# Patient Record
Sex: FEMALE | Race: BLACK OR AFRICAN AMERICAN | NOT HISPANIC OR LATINO | Employment: OTHER | URBAN - METROPOLITAN AREA
[De-identification: names, ages, dates, MRNs, and addresses within clinical notes are randomized per-mention and may not be internally consistent; named-entity substitution may affect disease eponyms.]

---

## 2023-06-14 DIAGNOSIS — I10 PRIMARY HYPERTENSION: Primary | ICD-10-CM

## 2023-06-14 RX ORDER — HYDROCHLOROTHIAZIDE 25 MG/1
TABLET ORAL
Qty: 90 TABLET | Refills: 3 | Status: SHIPPED | OUTPATIENT
Start: 2023-06-14 | End: 2023-06-16

## 2023-06-15 DIAGNOSIS — I10 PRIMARY HYPERTENSION: ICD-10-CM

## 2023-06-16 RX ORDER — HYDROCHLOROTHIAZIDE 25 MG/1
TABLET ORAL
Qty: 90 TABLET | Refills: 3 | Status: SHIPPED | OUTPATIENT
Start: 2023-06-16

## 2023-06-23 DIAGNOSIS — J41.8 MIXED SIMPLE AND MUCOPURULENT CHRONIC BRONCHITIS (MULTI): Primary | ICD-10-CM

## 2023-06-23 DIAGNOSIS — E03.9 ACQUIRED HYPOTHYROIDISM: ICD-10-CM

## 2023-06-26 RX ORDER — LEVOTHYROXINE SODIUM 25 UG/1
TABLET ORAL
Qty: 90 TABLET | Refills: 3 | Status: SHIPPED | OUTPATIENT
Start: 2023-06-26 | End: 2024-05-28

## 2023-06-26 RX ORDER — FLUTICASONE FUROATE, UMECLIDINIUM BROMIDE AND VILANTEROL TRIFENATATE 100; 62.5; 25 UG/1; UG/1; UG/1
POWDER RESPIRATORY (INHALATION)
Qty: 180 EACH | Refills: 3 | Status: SHIPPED | OUTPATIENT
Start: 2023-06-26

## 2023-11-17 ENCOUNTER — TELEPHONE (OUTPATIENT)
Dept: PRIMARY CARE | Facility: CLINIC | Age: 83
End: 2023-11-17

## 2023-11-22 ENCOUNTER — TELEPHONE (OUTPATIENT)
Dept: PRIMARY CARE | Facility: CLINIC | Age: 83
End: 2023-11-22
Payer: MEDICARE

## 2023-11-22 NOTE — TELEPHONE ENCOUNTER
Completed PT eval in patients home yesterday  Plan to see 2x a week for 2 weeks and once a week for one week  Focusing on home safety, balance, endurance   [phone] [facility]

## 2023-11-28 ENCOUNTER — TELEPHONE (OUTPATIENT)
Dept: PRIMARY CARE | Facility: CLINIC | Age: 83
End: 2023-11-28
Payer: MEDICARE

## 2023-11-28 NOTE — TELEPHONE ENCOUNTER
Nurse from [facility] called ,  [phone]  requesting  VO  for add on ,  speech therapy .

## 2023-12-11 PROBLEM — E66.09 OBESITY DUE TO EXCESS CALORIES WITH SERIOUS COMORBIDITY: Status: ACTIVE | Noted: 2023-12-11

## 2023-12-11 PROBLEM — J45.909 ASTHMA (HHS-HCC): Status: ACTIVE | Noted: 2023-12-11

## 2023-12-11 PROBLEM — H60.90 OTITIS EXTERNA: Status: ACTIVE | Noted: 2023-12-11

## 2023-12-11 PROBLEM — N18.32 CHRONIC KIDNEY DISEASE, STAGE 3B (MULTI): Status: ACTIVE | Noted: 2019-02-14

## 2023-12-11 PROBLEM — M79.89 LEFT LEG SWELLING: Status: ACTIVE | Noted: 2023-12-11

## 2023-12-11 PROBLEM — M54.50 LOW BACK PAIN WITH RADIATION: Status: ACTIVE | Noted: 2023-12-11

## 2023-12-11 PROBLEM — I10 HYPERTENSION: Status: ACTIVE | Noted: 2023-12-11

## 2023-12-11 PROBLEM — B33.8 RSV (RESPIRATORY SYNCYTIAL VIRUS INFECTION): Status: ACTIVE | Noted: 2023-11-13

## 2023-12-11 PROBLEM — M25.519 SHOULDER PAIN: Status: ACTIVE | Noted: 2023-12-11

## 2023-12-11 PROBLEM — J30.9 ALLERGIC RHINITIS: Status: ACTIVE | Noted: 2023-12-11

## 2023-12-11 PROBLEM — R41.0 DELIRIUM: Status: ACTIVE | Noted: 2020-02-15

## 2023-12-11 PROBLEM — R29.6 FALLS FREQUENTLY: Status: ACTIVE | Noted: 2023-12-11

## 2023-12-11 PROBLEM — Z77.090 ASBESTOS EXPOSURE: Status: ACTIVE | Noted: 2023-12-11

## 2023-12-11 PROBLEM — S39.011A ABDOMINAL WALL STRAIN: Status: ACTIVE | Noted: 2023-12-11
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
## 2023-12-11 PROBLEM — R53.1 WEAKNESS: Status: ACTIVE | Noted: 2023-12-11

## 2023-12-11 PROBLEM — J96.02 ACUTE HYPERCAPNIC RESPIRATORY FAILURE (MULTI): Status: ACTIVE | Noted: 2023-11-13

## 2023-12-11 PROBLEM — E66.9 OBESITY: Status: ACTIVE | Noted: 2023-12-11

## 2023-12-11 PROBLEM — E03.9 HYPOTHYROIDISM: Status: ACTIVE | Noted: 2023-12-11

## 2023-12-11 PROBLEM — M79.10 MYALGIA: Status: ACTIVE | Noted: 2023-12-11

## 2023-12-11 PROBLEM — R07.9 CHEST PAIN: Status: ACTIVE | Noted: 2020-10-05

## 2023-12-11 PROBLEM — J45.901 ASTHMA EXACERBATION (HHS-HCC): Status: ACTIVE | Noted: 2023-12-11

## 2023-12-11 PROBLEM — N18.31 CHRONIC KIDNEY DISEASE, STAGE 3A (MULTI): Status: ACTIVE | Noted: 2023-12-11

## 2023-12-11 PROBLEM — M19.90 OSTEOARTHRITIS: Status: ACTIVE | Noted: 2023-12-11

## 2023-12-11 PROBLEM — E53.8 B12 DEFICIENCY: Status: ACTIVE | Noted: 2023-12-11

## 2023-12-11 PROBLEM — E55.9 VITAMIN D DEFICIENCY: Status: ACTIVE | Noted: 2023-12-11

## 2023-12-11 PROBLEM — L50.9 URTICARIA: Status: ACTIVE | Noted: 2023-12-11

## 2023-12-11 PROBLEM — D17.9 LIPOMA: Status: ACTIVE | Noted: 2023-12-11

## 2023-12-11 PROBLEM — R73.9 HYPERGLYCEMIA: Status: ACTIVE | Noted: 2023-12-11

## 2023-12-11 PROBLEM — I25.10 ATHEROSCLEROSIS OF CORONARY ARTERY: Status: ACTIVE | Noted: 2017-11-21

## 2023-12-11 PROBLEM — M54.12 CERVICAL RADICULOPATHY: Status: ACTIVE | Noted: 2023-12-11

## 2023-12-11 PROBLEM — I83.93 VARICOSE VEINS OF LEGS: Status: ACTIVE | Noted: 2023-12-11

## 2023-12-11 PROBLEM — R06.09 DYSPNEA ON MINIMAL EXERTION: Status: ACTIVE | Noted: 2023-12-11

## 2023-12-11 PROBLEM — I50.9 CONGESTIVE HEART FAILURE (MULTI): Status: ACTIVE | Noted: 2023-12-11

## 2023-12-11 PROBLEM — R22.9 LUMP OF SKIN: Status: ACTIVE | Noted: 2023-12-11
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
## 2023-12-11 PROBLEM — M25.579 TOE JOINT PAIN: Status: ACTIVE | Noted: 2023-12-11

## 2023-12-11 PROBLEM — J45.20 MILD INTERMITTENT ASTHMA WITHOUT COMPLICATION (HHS-HCC): Status: ACTIVE | Noted: 2018-05-09

## 2023-12-11 PROBLEM — G62.9 NEUROPATHY: Status: ACTIVE | Noted: 2023-12-11

## 2023-12-11 PROBLEM — R09.02 HYPOXIA: Status: ACTIVE | Noted: 2023-12-11

## 2023-12-11 PROBLEM — J44.1 CHRONIC OBSTRUCTIVE PULMONARY DISEASE WITH ACUTE EXACERBATION (MULTI): Status: ACTIVE | Noted: 2018-05-30

## 2023-12-11 PROBLEM — E78.5 HYPERLIPIDEMIA: Status: ACTIVE | Noted: 2023-12-11

## 2023-12-11 PROBLEM — R53.83 FATIGUE: Status: ACTIVE | Noted: 2023-12-11

## 2023-12-11 PROBLEM — M85.80 OSTEOPENIA: Status: ACTIVE | Noted: 2023-12-11

## 2023-12-11 PROBLEM — M25.551 PAIN OF RIGHT HIP JOINT: Status: ACTIVE | Noted: 2023-12-11

## 2023-12-11 PROBLEM — H66.90 OTITIS MEDIA: Status: ACTIVE | Noted: 2023-12-11

## 2023-12-11 PROBLEM — B02.9 SHINGLES: Status: ACTIVE | Noted: 2023-12-11

## 2023-12-11 PROBLEM — I87.2 CHRONIC VENOUS INSUFFICIENCY: Status: ACTIVE | Noted: 2023-12-11

## 2023-12-11 PROBLEM — K21.9 GERD (GASTROESOPHAGEAL REFLUX DISEASE): Status: ACTIVE | Noted: 2023-12-11

## 2023-12-11 RX ORDER — MELOXICAM 7.5 MG/1
1 TABLET ORAL 2 TIMES DAILY PRN
COMMUNITY
Start: 2021-03-16

## 2023-12-11 RX ORDER — ASPIRIN 81 MG/1
1 TABLET ORAL DAILY
COMMUNITY
Start: 2019-08-16

## 2023-12-11 RX ORDER — ALBUTEROL SULFATE 90 UG/1
2 AEROSOL, METERED RESPIRATORY (INHALATION) EVERY 4 HOURS PRN
COMMUNITY
Start: 2017-11-13

## 2023-12-11 RX ORDER — CETIRIZINE HYDROCHLORIDE 10 MG/1
1 TABLET ORAL NIGHTLY
COMMUNITY
Start: 2019-08-16

## 2023-12-11 RX ORDER — ACETAMINOPHEN 500 MG
1 TABLET ORAL DAILY
COMMUNITY
Start: 2022-05-10

## 2023-12-11 RX ORDER — GABAPENTIN 100 MG/1
CAPSULE ORAL
COMMUNITY
Start: 2021-03-16

## 2023-12-12 ENCOUNTER — OFFICE VISIT (OUTPATIENT)
Dept: PRIMARY CARE | Facility: CLINIC | Age: 83
End: 2023-12-12
Payer: MEDICARE

## 2023-12-12 VITALS
OXYGEN SATURATION: 95 % | HEIGHT: 60 IN | BODY MASS INDEX: 43.98 KG/M2 | HEART RATE: 60 BPM | DIASTOLIC BLOOD PRESSURE: 62 MMHG | WEIGHT: 224 LBS | TEMPERATURE: 97.9 F | RESPIRATION RATE: 16 BRPM | SYSTOLIC BLOOD PRESSURE: 102 MMHG

## 2023-12-12 DIAGNOSIS — I50.42 CHRONIC COMBINED SYSTOLIC AND DIASTOLIC CONGESTIVE HEART FAILURE (MULTI): ICD-10-CM

## 2023-12-12 DIAGNOSIS — E78.2 MIXED HYPERLIPIDEMIA: ICD-10-CM

## 2023-12-12 DIAGNOSIS — N18.32 CHRONIC KIDNEY DISEASE, STAGE 3B (MULTI): ICD-10-CM

## 2023-12-12 DIAGNOSIS — R53.1 WEAKNESS: ICD-10-CM

## 2023-12-12 DIAGNOSIS — I95.2 HYPOTENSION DUE TO DRUGS: ICD-10-CM

## 2023-12-12 DIAGNOSIS — B33.8 RSV (RESPIRATORY SYNCYTIAL VIRUS INFECTION): ICD-10-CM

## 2023-12-12 DIAGNOSIS — J44.1 CHRONIC OBSTRUCTIVE PULMONARY DISEASE WITH ACUTE EXACERBATION (MULTI): Primary | ICD-10-CM

## 2023-12-12 DIAGNOSIS — R06.09 DYSPNEA ON MINIMAL EXERTION: ICD-10-CM

## 2023-12-12 PROBLEM — D17.9 LIPOMA: Status: RESOLVED | Noted: 2023-12-11 | Resolved: 2023-12-12

## 2023-12-12 PROBLEM — M79.89 LEFT LEG SWELLING: Status: RESOLVED | Noted: 2023-12-11 | Resolved: 2023-12-12

## 2023-12-12 PROBLEM — R07.9 CHEST PAIN: Status: RESOLVED | Noted: 2020-10-05 | Resolved: 2023-12-12
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
## 2023-12-12 PROBLEM — M54.50 LOW BACK PAIN WITH RADIATION: Status: RESOLVED | Noted: 2023-12-11 | Resolved: 2023-12-12

## 2023-12-12 PROBLEM — M25.551 PAIN OF RIGHT HIP JOINT: Status: RESOLVED | Noted: 2023-12-11 | Resolved: 2023-12-12

## 2023-12-12 PROBLEM — J45.901 ASTHMA EXACERBATION (HHS-HCC): Status: RESOLVED | Noted: 2023-12-11 | Resolved: 2023-12-12

## 2023-12-12 PROBLEM — M25.579 TOE JOINT PAIN: Status: RESOLVED | Noted: 2023-12-11 | Resolved: 2023-12-12

## 2023-12-12 PROBLEM — H66.90 OTITIS MEDIA: Status: RESOLVED | Noted: 2023-12-11 | Resolved: 2023-12-12

## 2023-12-12 PROBLEM — M25.519 SHOULDER PAIN: Status: RESOLVED | Noted: 2023-12-11 | Resolved: 2023-12-12

## 2023-12-12 PROBLEM — R22.9 LUMP OF SKIN: Status: RESOLVED | Noted: 2023-12-11 | Resolved: 2023-12-12

## 2023-12-12 PROBLEM — E66.9 OBESITY: Status: RESOLVED | Noted: 2023-12-11 | Resolved: 2023-12-12

## 2023-12-12 PROBLEM — H60.90 OTITIS EXTERNA: Status: RESOLVED | Noted: 2023-12-11 | Resolved: 2023-12-12

## 2023-12-12 PROBLEM — R53.83 FATIGUE: Status: RESOLVED | Noted: 2023-12-11 | Resolved: 2023-12-12

## 2023-12-12 PROBLEM — S39.011A ABDOMINAL WALL STRAIN: Status: RESOLVED | Noted: 2023-12-11 | Resolved: 2023-12-12

## 2023-12-12 PROBLEM — I83.93 VARICOSE VEINS OF LEGS: Status: RESOLVED | Noted: 2023-12-11 | Resolved: 2023-12-12

## 2023-12-12 PROBLEM — R29.6 FALLS FREQUENTLY: Status: RESOLVED | Noted: 2023-12-11 | Resolved: 2023-12-12
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
## 2023-12-12 PROBLEM — M79.10 MYALGIA: Status: RESOLVED | Noted: 2023-12-11 | Resolved: 2023-12-12

## 2023-12-12 PROBLEM — R09.02 HYPOXIA: Status: RESOLVED | Noted: 2023-12-11 | Resolved: 2023-12-12

## 2023-12-12 PROBLEM — L50.9 URTICARIA: Status: RESOLVED | Noted: 2023-12-11 | Resolved: 2023-12-12

## 2023-12-12 PROCEDURE — 1036F TOBACCO NON-USER: CPT | Performed by: INTERNAL MEDICINE

## 2023-12-12 PROCEDURE — 1159F MED LIST DOCD IN RCRD: CPT | Performed by: INTERNAL MEDICINE

## 2023-12-12 PROCEDURE — 99214 OFFICE O/P EST MOD 30 MIN: CPT | Performed by: INTERNAL MEDICINE

## 2023-12-12 PROCEDURE — 3078F DIAST BP <80 MM HG: CPT | Performed by: INTERNAL MEDICINE

## 2023-12-12 PROCEDURE — 3074F SYST BP LT 130 MM HG: CPT | Performed by: INTERNAL MEDICINE

## 2023-12-12 PROCEDURE — 1160F RVW MEDS BY RX/DR IN RCRD: CPT | Performed by: INTERNAL MEDICINE

## 2023-12-12 RX ORDER — IPRATROPIUM BROMIDE AND ALBUTEROL SULFATE 2.5; .5 MG/3ML; MG/3ML
3 SOLUTION RESPIRATORY (INHALATION) EVERY 8 HOURS PRN
COMMUNITY
Start: 2023-11-15 | End: 2023-12-25

## 2023-12-12 RX ORDER — DEXTROMETHORPHAN HYDROBROMIDE, GUAIFENESIN 5; 100 MG/5ML; MG/5ML
650 LIQUID ORAL EVERY 8 HOURS PRN
Qty: 60 TABLET | Refills: 3 | Status: SHIPPED | OUTPATIENT
Start: 2023-12-12 | End: 2024-03-01

## 2023-12-12 ASSESSMENT — PATIENT HEALTH QUESTIONNAIRE - PHQ9
2. FEELING DOWN, DEPRESSED OR HOPELESS: NOT AT ALL
SUM OF ALL RESPONSES TO PHQ9 QUESTIONS 1 AND 2: 0
1. LITTLE INTEREST OR PLEASURE IN DOING THINGS: NOT AT ALL

## 2023-12-12 NOTE — PROGRESS NOTES
Subjective   Fabiola Vale is a 83 y.o. female who presents for Hospital Follow-up.    HPI  Hospitalizied [facility] 11/13/23 - 11/15/23  Dx: Acute Hypercapnic Respiratory Failure, RSV, COPD exacerbation  Labs, CXR  Med changes: Prednisone, Robitussin DM, Duoneb via neb  [facility] PT/OT  Follow up: PCP  Reports some continued SOB.  BP low after discharge.  Holding hydrochlorothiazide.  BP WNL during [facility] visit.    C/o bilat knee pain w/transfers.  Using walker for transfers for ambulation  Tx: Tylenol, effective.  Requesting Rx.    Review of Systems   Constitutional:  Negative for fatigue and fever.   Respiratory:  Negative for cough and shortness of breath.    Cardiovascular:  Negative for chest pain and leg swelling.   All other systems reviewed and are negative.      Health Maintenance Due   Topic Date Due    Medicare Annual Wellness Visit (AWV)  Never done    Pneumococcal Vaccine: 65+ Years (1 - PCV) Never done    DTaP/Tdap/Td Vaccines (1 - Tdap) Never done    Zoster Vaccines (1 of 2) Never done    Echocardiogram  11/23/2021    COVID-19 Vaccine (4 - Pfizer series) 01/17/2022    Creatinine Level  05/03/2023    Potassium Level  05/03/2023    Influenza Vaccine (1) Never done    TSH Level  01/10/2024    Diabetes Screening  01/10/2024       Objective   /62   Pulse 60   Temp 36.6 °C (97.9 °F)   Resp 16   Ht 1.524 m (5')   Wt 102 kg (224 lb)   SpO2 95%   BMI 43.75 kg/m²     Physical Exam  Vitals and nursing note reviewed.   Constitutional:       Appearance: Normal appearance.   HENT:      Head: Normocephalic.   Eyes:      Conjunctiva/sclera: Conjunctivae normal.      Pupils: Pupils are equal, round, and reactive to light.   Cardiovascular:      Rate and Rhythm: Normal rate and regular rhythm.      Pulses: Normal pulses.      Heart sounds: Normal heart sounds.   Pulmonary:      Effort: Pulmonary effort is normal.      Breath sounds: Normal breath sounds.   Musculoskeletal:         General: No swelling.       Cervical back: Neck supple.   Skin:     General: Skin is warm and dry.   Neurological:      General: No focal deficit present.      Mental Status: She is oriented to person, place, and time.         Assessment/Plan   Problem List Items Addressed This Visit       Chronic obstructive pulmonary disease with acute exacerbation (CMS/HCC) - Primary    Chronic kidney disease, stage 3b (CMS/[facility])    Congestive heart failure (CMS/[facility])    Dyspnea on minimal exertion    Hyperlipidemia    RSV (respiratory syncytial virus infection)    Weakness    Relevant Medications    acetaminophen (Tylenol Arthritis Pain) 650 mg ER tablet     Other Visit Diagnoses       Hypotension due to drugs            Reviewed records  Doing home pt  Holding hydrochlorothiazide  Stable based on symptoms and exam.  Continue established treatment plan and follow up at least yearly.  Cont meds  Wheeled walker

## 2023-12-13 ASSESSMENT — ENCOUNTER SYMPTOMS
FATIGUE: 0
COUGH: 0
SHORTNESS OF BREATH: 0
FEVER: 0

## 2024-04-12 DIAGNOSIS — J44.1 CHRONIC OBSTRUCTIVE PULMONARY DISEASE WITH ACUTE EXACERBATION (MULTI): Primary | ICD-10-CM

## 2024-04-12 DIAGNOSIS — I50.42 CHRONIC COMBINED SYSTOLIC AND DIASTOLIC CONGESTIVE HEART FAILURE (MULTI): ICD-10-CM

## 2024-05-23 DIAGNOSIS — E03.9 ACQUIRED HYPOTHYROIDISM: ICD-10-CM

## 2024-05-28 RX ORDER — LEVOTHYROXINE SODIUM 25 UG/1
25 TABLET ORAL DAILY
Qty: 90 TABLET | Refills: 3 | Status: SHIPPED | OUTPATIENT
Start: 2024-05-28

## 2024-07-24 ENCOUNTER — TELEPHONE (OUTPATIENT)
Dept: PRIMARY CARE | Facility: CLINIC | Age: 84
End: 2024-07-24
Payer: MEDICARE

## 2024-07-24 NOTE — TELEPHONE ENCOUNTER
Teresa, [facility], left  stating they received referral from [facility] for PT/OT.  Questioned if you will follow    [phone] ext. [phone]

## 2024-07-25 ENCOUNTER — PATIENT OUTREACH (OUTPATIENT)
Dept: PRIMARY CARE | Facility: CLINIC | Age: 84
End: 2024-07-25
Payer: MEDICARE

## 2024-07-25 ENCOUNTER — DOCUMENTATION (OUTPATIENT)
Dept: PRIMARY CARE | Facility: CLINIC | Age: 84
End: 2024-07-25
Payer: MEDICARE

## 2024-07-25 RX ORDER — IPRATROPIUM BROMIDE AND ALBUTEROL SULFATE 2.5; .5 MG/3ML; MG/3ML
3 SOLUTION RESPIRATORY (INHALATION) 3 TIMES DAILY PRN
COMMUNITY

## 2024-07-25 RX ORDER — PREDNISONE 20 MG/1
40 TABLET ORAL DAILY
COMMUNITY
Start: 2024-07-25 | End: 2024-07-30

## 2024-07-25 NOTE — PROGRESS NOTES
Discharge Facility: [facility]  Discharge Diagnosis: COPD exacerbation  Admission Date: 7/22/2024  Discharge Date: 7/24/2024    PCP Appointment Date: 7/30/2024  Specialist Appointment Date: none    Hospital Encounter and Summary Linked: Yes  See discharge assessment below for further details    Home Health Care   Agency [facility]   Phone# [phone]     KEY MEDICATION CHANGES:   Prednisone 40 mg daily for 5 days prescribed  DuoNeb refilled     Engagement  Call Start Time: 0938 (7/25/2024  9:38 AM)    Medications  Medications reviewed with patient/caregiver?: Yes (7/25/2024  9:38 AM)  Is the patient having any side effects they believe may be caused by any medication additions or changes?: No (7/25/2024  9:38 AM)  Does the patient have all medications ordered at discharge?: Yes (7/25/2024  9:38 AM)  Care Management Interventions: No intervention needed (7/25/2024  9:38 AM)  Prescription Comments: new: prednisone (7/25/2024  9:38 AM)  Is the patient taking all medications as directed (includes completed medication regime)?: Yes (7/25/2024  9:38 AM)  Medication Comments: see med lsit- no issues with obtaining meds (7/25/2024  9:38 AM)    Appointments  Does the patient have a primary care provider?: Yes (7/25/2024  9:38 AM)  Care Management Interventions: Verified appointment date/time/provider (7/25/2024  9:38 AM)  Has the patient kept scheduled appointments due by today?: Yes (7/25/2024  9:38 AM)  Care Management Interventions: Advised patient to keep appointment (7/25/2024  9:38 AM)    Self Management  What is the home health agency?: [facility] (7/25/2024  9:38 AM)  Has home health visited the patient within 72 hours of discharge?: Call prior to 72 hours (7/25/2024  9:38 AM)    Patient Teaching  Does the patient have access to their discharge instructions?: Yes (7/25/2024  9:38 AM)  Care Management Interventions: Reviewed instructions with patient (7/25/2024  9:38 AM)  What is the  patient's perception of their health status since discharge?: Improving (7/25/2024  9:38 AM)  Is the patient/caregiver able to teach back the hierarchy of who to call/visit for symptoms/problems? PCP, Specialist, Home Health nurse, Urgent Care, ED, 911: Yes (7/25/2024  9:38 AM)    Wrap Up  Wrap Up Additional Comments: CTS spoke with patients daughter Momo. Patient was admitted to [facility] with COPD exacerbation on 7/22/2024. Discharged on 7/24/2024 with [facility]. Daughter stated that the patient was sleeping and resting comfortably. Denies any chest pains. Shortness of breath is better. Patient was discharged with prednisone. No issues obtaining the medications. Daughter does have the nebulizer are using it as directed. Appt is scheduled with PCP for hospital follow up. Understands discharge instructions. Has contact information if any needs arise. No questions or concerns voiced at this time. (7/25/2024  9:38 AM)  Call End Time: 0946 (7/25/2024  9:38 AM)

## 2024-07-30 ENCOUNTER — APPOINTMENT (OUTPATIENT)
Dept: PRIMARY CARE | Facility: CLINIC | Age: 84
End: 2024-07-30
Payer: MEDICARE

## 2024-07-30 VITALS
BODY MASS INDEX: 45.94 KG/M2 | DIASTOLIC BLOOD PRESSURE: 72 MMHG | TEMPERATURE: 97.9 F | SYSTOLIC BLOOD PRESSURE: 138 MMHG | HEIGHT: 60 IN | WEIGHT: 234 LBS | HEART RATE: 60 BPM | RESPIRATION RATE: 16 BRPM | OXYGEN SATURATION: 96 %

## 2024-07-30 DIAGNOSIS — I50.42 CHRONIC COMBINED SYSTOLIC AND DIASTOLIC CONGESTIVE HEART FAILURE (MULTI): ICD-10-CM

## 2024-07-30 DIAGNOSIS — E66.01 CLASS 3 SEVERE OBESITY DUE TO EXCESS CALORIES WITH SERIOUS COMORBIDITY AND BODY MASS INDEX (BMI) OF 45.0 TO 49.9 IN ADULT (MULTI): ICD-10-CM

## 2024-07-30 DIAGNOSIS — Z87.448 HISTORY OF ACUTE RENAL FAILURE: ICD-10-CM

## 2024-07-30 DIAGNOSIS — E78.2 MIXED HYPERLIPIDEMIA: ICD-10-CM

## 2024-07-30 DIAGNOSIS — J44.1 CHRONIC OBSTRUCTIVE PULMONARY DISEASE WITH ACUTE EXACERBATION (MULTI): ICD-10-CM

## 2024-07-30 DIAGNOSIS — M81.0 POSTMENOPAUSAL OSTEOPOROSIS: ICD-10-CM

## 2024-07-30 DIAGNOSIS — J96.02 ACUTE HYPERCAPNIC RESPIRATORY FAILURE (MULTI): ICD-10-CM

## 2024-07-30 DIAGNOSIS — B37.0 THRUSH: Primary | ICD-10-CM

## 2024-07-30 DIAGNOSIS — J41.8 MIXED SIMPLE AND MUCOPURULENT CHRONIC BRONCHITIS (MULTI): ICD-10-CM

## 2024-07-30 DIAGNOSIS — M15.9 PRIMARY OSTEOARTHRITIS INVOLVING MULTIPLE JOINTS: ICD-10-CM

## 2024-07-30 DIAGNOSIS — J45.31 MILD PERSISTENT ASTHMA WITH ACUTE EXACERBATION (HHS-HCC): ICD-10-CM

## 2024-07-30 DIAGNOSIS — E03.9 ACQUIRED HYPOTHYROIDISM: ICD-10-CM

## 2024-07-30 DIAGNOSIS — E53.8 B12 DEFICIENCY: ICD-10-CM

## 2024-07-30 DIAGNOSIS — N18.32 CHRONIC KIDNEY DISEASE, STAGE 3B (MULTI): ICD-10-CM

## 2024-07-30 DIAGNOSIS — Z12.31 ENCOUNTER FOR SCREENING MAMMOGRAM FOR MALIGNANT NEOPLASM OF BREAST: ICD-10-CM

## 2024-07-30 DIAGNOSIS — E55.9 VITAMIN D DEFICIENCY: ICD-10-CM

## 2024-07-30 DIAGNOSIS — Z00.00 HEALTHCARE MAINTENANCE: ICD-10-CM

## 2024-07-30 DIAGNOSIS — I10 PRIMARY HYPERTENSION: ICD-10-CM

## 2024-07-30 PROBLEM — B02.9 SHINGLES: Status: RESOLVED | Noted: 2023-12-11 | Resolved: 2024-07-30

## 2024-07-30 PROBLEM — R53.1 WEAKNESS: Status: RESOLVED | Noted: 2023-12-11 | Resolved: 2024-07-30

## 2024-07-30 PROBLEM — R06.09 DYSPNEA ON MINIMAL EXERTION: Status: RESOLVED | Noted: 2023-12-11 | Resolved: 2024-07-30

## 2024-07-30 PROBLEM — R41.0 DELIRIUM: Status: RESOLVED | Noted: 2020-02-15 | Resolved: 2024-07-30

## 2024-07-30 PROBLEM — B33.8 RSV (RESPIRATORY SYNCYTIAL VIRUS INFECTION): Status: RESOLVED | Noted: 2023-11-13 | Resolved: 2024-07-30

## 2024-07-30 PROCEDURE — 1158F ADVNC CARE PLAN TLK DOCD: CPT | Performed by: INTERNAL MEDICINE

## 2024-07-30 PROCEDURE — 1159F MED LIST DOCD IN RCRD: CPT | Performed by: INTERNAL MEDICINE

## 2024-07-30 PROCEDURE — 1036F TOBACCO NON-USER: CPT | Performed by: INTERNAL MEDICINE

## 2024-07-30 PROCEDURE — G0439 PPPS, SUBSEQ VISIT: HCPCS | Performed by: INTERNAL MEDICINE

## 2024-07-30 PROCEDURE — 1123F ACP DISCUSS/DSCN MKR DOCD: CPT | Performed by: INTERNAL MEDICINE

## 2024-07-30 PROCEDURE — 99496 TRANSJ CARE MGMT HIGH F2F 7D: CPT | Performed by: INTERNAL MEDICINE

## 2024-07-30 PROCEDURE — 1160F RVW MEDS BY RX/DR IN RCRD: CPT | Performed by: INTERNAL MEDICINE

## 2024-07-30 PROCEDURE — 3078F DIAST BP <80 MM HG: CPT | Performed by: INTERNAL MEDICINE

## 2024-07-30 PROCEDURE — 3075F SYST BP GE 130 - 139MM HG: CPT | Performed by: INTERNAL MEDICINE

## 2024-07-30 RX ORDER — FUROSEMIDE 20 MG/1
20 TABLET ORAL DAILY
Qty: 2 TABLET | Refills: 0 | Status: SHIPPED | OUTPATIENT
Start: 2024-07-30 | End: 2024-08-01

## 2024-07-30 RX ORDER — CHLORHEXIDINE GLUCONATE ORAL RINSE 1.2 MG/ML
15 SOLUTION DENTAL AS NEEDED
Qty: 120 ML | Refills: 0 | Status: SHIPPED | OUTPATIENT
Start: 2024-07-30 | End: 2024-07-31 | Stop reason: SDUPTHER

## 2024-07-30 RX ORDER — NYSTATIN 100000 [USP'U]/ML
500000 SUSPENSION ORAL 4 TIMES DAILY
Qty: 280 ML | Refills: 1 | Status: SHIPPED | OUTPATIENT
Start: 2024-07-30 | End: 2024-09-28

## 2024-07-30 RX ORDER — PREDNISONE 10 MG/1
TABLET ORAL
Qty: 30 TABLET | Refills: 0 | Status: SHIPPED | OUTPATIENT
Start: 2024-07-30 | End: 2024-08-09

## 2024-07-30 ASSESSMENT — PATIENT HEALTH QUESTIONNAIRE - PHQ9
1. LITTLE INTEREST OR PLEASURE IN DOING THINGS: NOT AT ALL
SUM OF ALL RESPONSES TO PHQ9 QUESTIONS 1 AND 2: 0
2. FEELING DOWN, DEPRESSED OR HOPELESS: NOT AT ALL

## 2024-07-30 ASSESSMENT — ENCOUNTER SYMPTOMS
DEPRESSION: 0
COUGH: 1
OCCASIONAL FEELINGS OF UNSTEADINESS: 1
LOSS OF SENSATION IN FEET: 0
FEVER: 0
SHORTNESS OF BREATH: 1
FATIGUE: 1

## 2024-07-30 NOTE — PROGRESS NOTES
Subjective   Fabiola Vale is a 84 y.o. female who presents for Hospital Follow-up.    HPI   Hospitalized [facility] 7/22/24 - 7/24/24  Dx: SOB  Labs, CXR, EKG  Med changes: Prednisone  Referred [facility]  Follow up: PCP  C/o continued SOB, occasional chest pressure, fatigued w/exertion.  C/o sore throat.  Using Duoneb daily.    Still feeling short of breath    Flu declines  Covid x3  Shingrix  Pneumo  Tdap  Mamm 22  Pap age  Dxa  Colon cancer screen cologuard 2022  Bmi 45  Depression screen 24  Eye exam 24  Fall risk 24  Adv dir yes    Requesting chlorahexadine.    Review of Systems   Constitutional:  Positive for fatigue. Negative for fever.   Respiratory:  Positive for cough and shortness of breath.    Cardiovascular:  Negative for chest pain and leg swelling.   All other systems reviewed and are negative.      Health Maintenance Due   Topic Date Due    Medicare Annual Wellness Visit (AWV)  Never done    Pneumococcal Vaccine: 65+ Years (1 of 2 - PCV) Never done    CKD: Urine Protein Screening  Never done    DTaP/Tdap/Td Vaccines (1 - Tdap) Never done    Zoster Vaccines (1 of 2) Never done    RSV Pregnant patients and/or  patients aged 60+ years (1 - 1-dose 60+ series) Never done    Echocardiogram  11/23/2021    Creatinine Level  05/03/2023    Potassium Level  05/03/2023    COVID-19 Vaccine (4 - 2023-24 season) 09/01/2023    TSH Level  01/10/2024    Diabetes Screening  01/10/2024       Objective   /72   Pulse 60   Temp 36.6 °C (97.9 °F)   Resp 16   Ht 1.524 m (5')   Wt 106 kg (234 lb)   SpO2 96%   BMI 45.70 kg/m²     Physical Exam  Vitals and nursing note reviewed.   Constitutional:       Appearance: Normal appearance.   HENT:      Head: Normocephalic.   Eyes:      Conjunctiva/sclera: Conjunctivae normal.      Pupils: Pupils are equal, round, and reactive to light.   Cardiovascular:      Rate and Rhythm: Normal rate and regular rhythm.      Pulses: Normal pulses.      Heart sounds: Normal heart sounds.    Pulmonary:      Effort: Pulmonary effort is normal.      Breath sounds: Normal breath sounds.   Musculoskeletal:         General: No swelling.      Cervical back: Neck supple.   Skin:     General: Skin is warm and dry.   Neurological:      General: No focal deficit present.      Mental Status: She is oriented to person, place, and time.         Assessment/Plan   Problem List Items Addressed This Visit       Acute hypercapnic respiratory failure (Multi)    Asthma ([facility])    Chronic obstructive pulmonary disease with acute exacerbation (Multi)    Relevant Medications    predniSONE (Deltasone) 10 mg tablet    B12 deficiency    Relevant Orders    Vitamin B12    Chronic kidney disease, stage 3b (Multi)    Congestive heart failure (Multi)    Relevant Medications    furosemide (Lasix) 20 mg tablet    Hyperlipidemia    Relevant Orders    Thyroid Stimulating Hormone    Lipid Panel    Hypertension    Relevant Orders    CBC    Comprehensive Metabolic Panel    Hypothyroidism    Obesity due to excess calories with serious comorbidity    Osteoarthritis    Vitamin D deficiency    Relevant Orders    Vitamin D 25-Hydroxy,Total (for eval of Vitamin D levels)     Other Visit Diagnoses       Thrush    -  Primary    Relevant Medications    chlorhexidine (Peridex) 0.12 % solution    nystatin (Mycostatin) 100,000 unit/mL suspension    Mixed simple and mucopurulent chronic bronchitis (Multi)        Healthcare maintenance        Encounter for screening mammogram for malignant neoplasm of breast        Relevant Orders    BI mammo bilateral screening tomosynthesis    Postmenopausal osteoporosis        Relevant Orders    XR DEXA bone density    History of acute renal failure            Updagte preventive  Cont meds  Add steroid and lasix x 2 days  Activity  Check labs  Stable based on symptoms and exam.  Continue established treatment plan and follow up at least yearly.  Reviewed all records  Call with update in 2-3 days         Patient  was identified as a fall risk. Risk prevention instructions provided.

## 2024-07-30 NOTE — PATIENT INSTRUCTIONS

## 2024-07-31 DIAGNOSIS — B37.0 THRUSH: ICD-10-CM

## 2024-07-31 RX ORDER — CHLORHEXIDINE GLUCONATE ORAL RINSE 1.2 MG/ML
15 SOLUTION DENTAL DAILY
Qty: 120 ML | Refills: 0 | Status: SHIPPED | OUTPATIENT
Start: 2024-07-31

## 2024-08-02 ENCOUNTER — PATIENT OUTREACH (OUTPATIENT)
Dept: PRIMARY CARE | Facility: CLINIC | Age: 84
End: 2024-08-02
Payer: MEDICARE

## 2024-08-02 NOTE — PROGRESS NOTES
Call regarding appt. with PCP on 7/30/2024 after hospitalization.  At time of outreach call the patient feels as if their condition has improved since last visit.  Reviewed the PCP appointment with the pt and addressed any questions or concerns.

## 2024-08-20 ENCOUNTER — APPOINTMENT (OUTPATIENT)
Dept: RADIOLOGY | Facility: HOSPITAL | Age: 84
DRG: 884 | End: 2024-08-20
Payer: MEDICARE

## 2024-08-20 ENCOUNTER — APPOINTMENT (OUTPATIENT)
Dept: CARDIOLOGY | Facility: HOSPITAL | Age: 84
DRG: 884 | End: 2024-08-20
Payer: MEDICARE

## 2024-08-20 ENCOUNTER — HOSPITAL ENCOUNTER (EMERGENCY)
Facility: HOSPITAL | Age: 84
Discharge: HOME | DRG: 884 | End: 2024-08-20
Payer: MEDICARE

## 2024-08-20 VITALS
HEART RATE: 63 BPM | RESPIRATION RATE: 16 BRPM | BODY MASS INDEX: 45.16 KG/M2 | SYSTOLIC BLOOD PRESSURE: 141 MMHG | DIASTOLIC BLOOD PRESSURE: 65 MMHG | HEIGHT: 60 IN | OXYGEN SATURATION: 94 % | TEMPERATURE: 97.5 F | WEIGHT: 230 LBS

## 2024-08-20 DIAGNOSIS — K59.00 CONSTIPATION, UNSPECIFIED CONSTIPATION TYPE: Primary | ICD-10-CM

## 2024-08-20 DIAGNOSIS — N30.91 CYSTITIS WITH HEMATURIA: ICD-10-CM

## 2024-08-20 LAB
ALBUMIN SERPL BCP-MCNC: 3.8 G/DL (ref 3.4–5)
ALP SERPL-CCNC: 77 U/L (ref 33–136)
ALT SERPL W P-5'-P-CCNC: 9 U/L (ref 7–45)
ANION GAP SERPL CALC-SCNC: 13 MMOL/L (ref 10–20)
APPEARANCE UR: ABNORMAL
AST SERPL W P-5'-P-CCNC: 14 U/L (ref 9–39)
BACTERIA #/AREA URNS AUTO: ABNORMAL /HPF
BASOPHILS # BLD AUTO: 0.01 X10*3/UL (ref 0–0.1)
BASOPHILS NFR BLD AUTO: 0.2 %
BILIRUB SERPL-MCNC: 0.8 MG/DL (ref 0–1.2)
BILIRUB UR STRIP.AUTO-MCNC: NEGATIVE MG/DL
BUN SERPL-MCNC: 14 MG/DL (ref 6–23)
CALCIUM SERPL-MCNC: 9 MG/DL (ref 8.6–10.3)
CARDIAC TROPONIN I PNL SERPL HS: 12 NG/L (ref 0–13)
CHLORIDE SERPL-SCNC: 108 MMOL/L (ref 98–107)
CO2 SERPL-SCNC: 24 MMOL/L (ref 21–32)
COLOR UR: YELLOW
CREAT SERPL-MCNC: 1.16 MG/DL (ref 0.5–1.05)
EGFRCR SERPLBLD CKD-EPI 2021: 47 ML/MIN/1.73M*2
EOSINOPHIL # BLD AUTO: 0.1 X10*3/UL (ref 0–0.4)
EOSINOPHIL NFR BLD AUTO: 2.4 %
ERYTHROCYTE [DISTWIDTH] IN BLOOD BY AUTOMATED COUNT: 13.5 % (ref 11.5–14.5)
GLUCOSE SERPL-MCNC: 98 MG/DL (ref 74–99)
GLUCOSE UR STRIP.AUTO-MCNC: NORMAL MG/DL
HCT VFR BLD AUTO: 41 % (ref 36–46)
HGB BLD-MCNC: 13.5 G/DL (ref 12–16)
HOLD SPECIMEN: NORMAL
IMM GRANULOCYTES # BLD AUTO: 0.01 X10*3/UL (ref 0–0.5)
IMM GRANULOCYTES NFR BLD AUTO: 0.2 % (ref 0–0.9)
KETONES UR STRIP.AUTO-MCNC: ABNORMAL MG/DL
LEUKOCYTE ESTERASE UR QL STRIP.AUTO: ABNORMAL
LIPASE SERPL-CCNC: 6 U/L (ref 9–82)
LYMPHOCYTES # BLD AUTO: 1.05 X10*3/UL (ref 0.8–3)
LYMPHOCYTES NFR BLD AUTO: 25.4 %
MCH RBC QN AUTO: 30.7 PG (ref 26–34)
MCHC RBC AUTO-ENTMCNC: 32.9 G/DL (ref 32–36)
MCV RBC AUTO: 93 FL (ref 80–100)
MONOCYTES # BLD AUTO: 0.36 X10*3/UL (ref 0.05–0.8)
MONOCYTES NFR BLD AUTO: 8.7 %
MUCOUS THREADS #/AREA URNS AUTO: ABNORMAL /LPF
NEUTROPHILS # BLD AUTO: 2.61 X10*3/UL (ref 1.6–5.5)
NEUTROPHILS NFR BLD AUTO: 63.1 %
NITRITE UR QL STRIP.AUTO: NEGATIVE
NRBC BLD-RTO: 0 /100 WBCS (ref 0–0)
PH UR STRIP.AUTO: 5.5 [PH]
PLATELET # BLD AUTO: 213 X10*3/UL (ref 150–450)
POTASSIUM SERPL-SCNC: 3.8 MMOL/L (ref 3.5–5.3)
PROT SERPL-MCNC: 6.8 G/DL (ref 6.4–8.2)
PROT UR STRIP.AUTO-MCNC: ABNORMAL MG/DL
RBC # BLD AUTO: 4.4 X10*6/UL (ref 4–5.2)
RBC # UR STRIP.AUTO: ABNORMAL /UL
RBC #/AREA URNS AUTO: ABNORMAL /HPF
SODIUM SERPL-SCNC: 141 MMOL/L (ref 136–145)
SP GR UR STRIP.AUTO: >1.05
SQUAMOUS #/AREA URNS AUTO: ABNORMAL /HPF
UROBILINOGEN UR STRIP.AUTO-MCNC: ABNORMAL MG/DL
WBC # BLD AUTO: 4.1 X10*3/UL (ref 4.4–11.3)
WBC #/AREA URNS AUTO: ABNORMAL /HPF

## 2024-08-20 PROCEDURE — 96376 TX/PRO/DX INJ SAME DRUG ADON: CPT

## 2024-08-20 PROCEDURE — 71045 X-RAY EXAM CHEST 1 VIEW: CPT | Performed by: RADIOLOGY

## 2024-08-20 PROCEDURE — 80053 COMPREHEN METABOLIC PANEL: CPT | Performed by: REGISTERED NURSE

## 2024-08-20 PROCEDURE — 70450 CT HEAD/BRAIN W/O DYE: CPT | Performed by: RADIOLOGY

## 2024-08-20 PROCEDURE — 81001 URINALYSIS AUTO W/SCOPE: CPT | Performed by: REGISTERED NURSE

## 2024-08-20 PROCEDURE — 74177 CT ABD & PELVIS W/CONTRAST: CPT | Performed by: RADIOLOGY

## 2024-08-20 PROCEDURE — 2550000001 HC RX 255 CONTRASTS: Performed by: REGISTERED NURSE

## 2024-08-20 PROCEDURE — 96375 TX/PRO/DX INJ NEW DRUG ADDON: CPT

## 2024-08-20 PROCEDURE — 85025 COMPLETE CBC W/AUTO DIFF WBC: CPT | Performed by: REGISTERED NURSE

## 2024-08-20 PROCEDURE — 83690 ASSAY OF LIPASE: CPT | Performed by: REGISTERED NURSE

## 2024-08-20 PROCEDURE — 96374 THER/PROPH/DIAG INJ IV PUSH: CPT | Mod: 59

## 2024-08-20 PROCEDURE — 70450 CT HEAD/BRAIN W/O DYE: CPT

## 2024-08-20 PROCEDURE — 93005 ELECTROCARDIOGRAM TRACING: CPT

## 2024-08-20 PROCEDURE — 84484 ASSAY OF TROPONIN QUANT: CPT | Performed by: REGISTERED NURSE

## 2024-08-20 PROCEDURE — 36415 COLL VENOUS BLD VENIPUNCTURE: CPT | Performed by: REGISTERED NURSE

## 2024-08-20 PROCEDURE — 71045 X-RAY EXAM CHEST 1 VIEW: CPT

## 2024-08-20 PROCEDURE — 2500000002 HC RX 250 W HCPCS SELF ADMINISTERED DRUGS (ALT 637 FOR MEDICARE OP, ALT 636 FOR OP/ED): Performed by: REGISTERED NURSE

## 2024-08-20 PROCEDURE — 87086 URINE CULTURE/COLONY COUNT: CPT | Mod: ELYLAB | Performed by: REGISTERED NURSE

## 2024-08-20 PROCEDURE — 74177 CT ABD & PELVIS W/CONTRAST: CPT

## 2024-08-20 PROCEDURE — 2500000005 HC RX 250 GENERAL PHARMACY W/O HCPCS: Performed by: REGISTERED NURSE

## 2024-08-20 PROCEDURE — 99285 EMERGENCY DEPT VISIT HI MDM: CPT | Mod: 25

## 2024-08-20 PROCEDURE — 2500000004 HC RX 250 GENERAL PHARMACY W/ HCPCS (ALT 636 FOR OP/ED): Performed by: REGISTERED NURSE

## 2024-08-20 PROCEDURE — 84443 ASSAY THYROID STIM HORMONE: CPT | Performed by: INTERNAL MEDICINE

## 2024-08-20 RX ORDER — ONDANSETRON HYDROCHLORIDE 2 MG/ML
4 INJECTION, SOLUTION INTRAVENOUS ONCE
Status: COMPLETED | OUTPATIENT
Start: 2024-08-20 | End: 2024-08-20

## 2024-08-20 RX ORDER — SENNOSIDES 8.6 MG/1
1 TABLET ORAL DAILY
Qty: 2 TABLET | Refills: 0 | Status: ON HOLD | OUTPATIENT
Start: 2024-08-20 | End: 2024-08-24

## 2024-08-20 RX ORDER — MORPHINE SULFATE 2 MG/ML
2 INJECTION, SOLUTION INTRAMUSCULAR; INTRAVENOUS ONCE
Status: COMPLETED | OUTPATIENT
Start: 2024-08-20 | End: 2024-08-20

## 2024-08-20 RX ORDER — SULFAMETHOXAZOLE AND TRIMETHOPRIM 800; 160 MG/1; MG/1
1 TABLET ORAL ONCE
Status: COMPLETED | OUTPATIENT
Start: 2024-08-20 | End: 2024-08-20

## 2024-08-20 RX ORDER — SULFAMETHOXAZOLE AND TRIMETHOPRIM 800; 160 MG/1; MG/1
1 TABLET ORAL 2 TIMES DAILY
Qty: 28 TABLET | Refills: 0 | Status: SHIPPED | OUTPATIENT
Start: 2024-08-20 | End: 2024-08-24 | Stop reason: HOSPADM

## 2024-08-20 RX ORDER — FAMOTIDINE 10 MG/ML
40 INJECTION INTRAVENOUS ONCE
Status: COMPLETED | OUTPATIENT
Start: 2024-08-20 | End: 2024-08-20

## 2024-08-20 ASSESSMENT — PAIN DESCRIPTION - PAIN TYPE
TYPE: ACUTE PAIN
TYPE: ACUTE PAIN

## 2024-08-20 ASSESSMENT — PAIN DESCRIPTION - PROGRESSION
CLINICAL_PROGRESSION: GRADUALLY IMPROVING
CLINICAL_PROGRESSION: GRADUALLY IMPROVING

## 2024-08-20 ASSESSMENT — PAIN - FUNCTIONAL ASSESSMENT
PAIN_FUNCTIONAL_ASSESSMENT: 0-10

## 2024-08-20 ASSESSMENT — PAIN SCALES - GENERAL
PAINLEVEL_OUTOF10: 10 - WORST POSSIBLE PAIN
PAINLEVEL_OUTOF10: 6
PAINLEVEL_OUTOF10: 6
PAINLEVEL_OUTOF10: 9

## 2024-08-20 ASSESSMENT — LIFESTYLE VARIABLES
HAVE YOU EVER FELT YOU SHOULD CUT DOWN ON YOUR DRINKING: NO
TOTAL SCORE: 0
EVER HAD A DRINK FIRST THING IN THE MORNING TO STEADY YOUR NERVES TO GET RID OF A HANGOVER: NO
EVER FELT BAD OR GUILTY ABOUT YOUR DRINKING: NO
HAVE PEOPLE ANNOYED YOU BY CRITICIZING YOUR DRINKING: NO

## 2024-08-20 ASSESSMENT — PAIN DESCRIPTION - LOCATION: LOCATION: BACK

## 2024-08-20 NOTE — ED PROVIDER NOTES
HPI   Chief Complaint   Patient presents with    Rectal Pain     Was constipated and had a bowel movement yesterday after daughter gave Denise lax on Sunday      84-year-old female with past medical history significant for COPD, kidney disease, hypertension, GERD, hypothyroidism presents emergency department today for evaluation of what her daughter believes to be constipation.  Patient's daughter is at bedside tells me she has been patient's caregiver for the last 2 years.  She tells me the patient has no official diagnosis of dementia but she thinks that she might have this.  She tells me over the last few days the patient has been experiencing pain in low lower back and has not had a good bowel movement.  She tells me that patient is alert and oriented x 3 and independent with her own care.  She tells me she has continued to be independent with her own care but she does not think that she has had a bowel movement.  Daughter did want her to be tested for dementia while in the ED today.  However I did explain to her we are unable to do this and that she could follow with her primary care physician.  Patient herself has no complaints.  Patient is answering my questions by nodding however she is not speaking at the moment.  Patient tells me she is having pain in her rectum.      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  Social History     Tobacco Use    Smoking status: Former     Types: Cigarettes    Smokeless tobacco: Never   Substance Use Topics     Alcohol use: Not on file    Drug use: Not on file       Physical Exam   ED Triage Vitals [08/20/24 0830]   Temperature Heart Rate Respirations BP   36.2 °C (97.2 °F) 81 20 159/64      Pulse Ox Temp Source Heart Rate Source Patient Position   94 % Temporal Monitor Sitting      BP Location FiO2 (%)     Right arm --       Physical Exam  Vitals and nursing note reviewed.   Constitutional:       Appearance: Normal appearance. She is normal weight.   HENT:      Head: Normocephalic and atraumatic.      Right Ear: Tympanic membrane normal.      Left Ear: Tympanic membrane normal.      Nose: Nose normal.      Mouth/Throat:      Mouth: Mucous membranes are moist.   Eyes:      Pupils: Pupils are equal, round, and reactive to light.   Cardiovascular:      Rate and Rhythm: Normal rate and regular rhythm.   Pulmonary:      Effort: Pulmonary effort is normal.      Breath sounds: Normal breath sounds.   Abdominal:      General: Bowel sounds are normal.      Palpations: Abdomen is soft.      Tenderness: There is generalized abdominal tenderness.   Musculoskeletal:         General: Normal range of motion.      Cervical back: Normal range of motion and neck supple.   Skin:     General: Skin is warm.      Capillary Refill: Capillary refill takes less than 2 seconds.   Neurological:      General: No focal deficit present.      Mental Status: She is alert and oriented to person, place, and time.   Psychiatric:         Mood and Affect: Mood normal.         Behavior: Behavior normal.         Thought Content: Thought content normal.         Judgment: Judgment normal.           ED Course & MDM   Diagnoses as of 08/20/24 1403   Constipation, unspecified constipation type   Cystitis with hematuria                 No data recorded     Mary Coma Scale Score: 15 (08/20/24 0854 : Ravi Canchola RN)                           Medical Decision Making    Patient seen examined emergency department; patient is nontoxic in appearance not appearing  acute distress.  Patient's abdomen is diffusely tender without any localization of pain.  Patient's lung sounds are clear bilaterally without any adventitious noise.  Heart regular rate and rhythm without any murmur appreciated.  Initially patient was not communicative however I did go back in again and she was able to better articulate her pain.  She tells me that it is in her rectum and she has not had a bowel movement for several days.  Given patient's reported constipation will order CT of the abdomen pelvis to evaluate for obstruction, perforation or obstruction.  I also will order head CT, urinalysis CBC and CMP to evaluate for source of daughter's reported confusion.  Patient's symptoms treated with IV fluids, IV Pepcid, IV Zofran and IV morphine    EKG at 09:29 with ventricular rate 59, as interpreted by me, shows a sinus bradycardia, normal axis, normal intervals. And normal ST and T wave pattern with no evidence of acute ischemia or other acute findings    CBC is unremarkable on my review, CMP with a slightly elevated creatinine at 1.16.  Urinalysis does have leukocytes and blood however is negative for nitrites but there is a large amount of white blood cells noted.  High sensitive troponin is negative.  Chest x-ray does show some prominence of the cardiac silhouette.    CT of the abdomen pelvis does show possible fecal impaction.  I did go to disimpact patient however she did not tolerate this well only a small amount of soft stool was able to be removed.  Patient attempting to sit on bedpan and have her own bowel movement.  Patient was unable to successfully use the bedpan therefore we did attempt a fleets enema however patient did not do well with this.  I did call and talk to daughter about discharge planning she is no longer at bedside.  She tells me that she did give patient MiraLAX yesterday however I did tell her that multiple doses are typically required to relieve constipation.  I did discuss  with her discharge.  Patient will be discharged home with a prescription for Bactrim given her UA results.  Will also discharge home with 2 senna pills.  I did direct daughter to give her 1 senna pill tonight and if she does not have a BM by tomorrow afternoon she should administer the second pill.  I did also recommend that she follow-up with patient's primary care provider as previously discussed for dementia diagnosis.  All questions and concerns were addressed prior to discharge.  Patient discharged home in stable condition.    Labs Reviewed   CBC WITH AUTO DIFFERENTIAL - Abnormal       Result Value    WBC 4.1 (*)     nRBC 0.0      RBC 4.40      Hemoglobin 13.5      Hematocrit 41.0      MCV 93      MCH 30.7      MCHC 32.9      RDW 13.5      Platelets 213      Neutrophils % 63.1      Immature Granulocytes %, Automated 0.2      Lymphocytes % 25.4      Monocytes % 8.7      Eosinophils % 2.4      Basophils % 0.2      Neutrophils Absolute 2.61      Immature Granulocytes Absolute, Automated 0.01      Lymphocytes Absolute 1.05      Monocytes Absolute 0.36      Eosinophils Absolute 0.10      Basophils Absolute 0.01     COMPREHENSIVE METABOLIC PANEL - Abnormal    Glucose 98      Sodium 141      Potassium 3.8      Chloride 108 (*)     Bicarbonate 24      Anion Gap 13      Urea Nitrogen 14      Creatinine 1.16 (*)     eGFR 47 (*)     Calcium 9.0      Albumin 3.8      Alkaline Phosphatase 77      Total Protein 6.8      AST 14      Bilirubin, Total 0.8      ALT 9     LIPASE - Abnormal    Lipase 6 (*)     Narrative:     Venipuncture immediately after or during the administration of Metamizole may lead to falsely low results. Testing should be performed immediately prior to Metamizole dosing.   URINALYSIS WITH REFLEX CULTURE AND MICROSCOPIC - Abnormal    Color, Urine Yellow      Appearance, Urine Turbid (*)     Specific Gravity, Urine >1.050 (*)     pH, Urine 5.5      Protein, Urine 30 (1+) (*)     Glucose, Urine Normal       Blood, Urine 0.2 (2+) (*)     Ketones, Urine 10 (1+) (*)     Bilirubin, Urine NEGATIVE      Urobilinogen, Urine 2 (1+) (*)     Nitrite, Urine NEGATIVE      Leukocyte Esterase, Urine 75 Alyssa/µL (*)    MICROSCOPIC ONLY, URINE - Abnormal    WBC, Urine 6-10 (*)     RBC, Urine 11-20 (*)     Squamous Epithelial Cells, Urine 10-25 (FEW)      Bacteria, Urine 1+ (*)     Mucus, Urine 3+     TROPONIN I, HIGH SENSITIVITY - Normal    Troponin I, High Sensitivity 12      Narrative:     Less than 99th percentile of normal range cutoff-  Female and children under 18 years old <14 ng/L; Male <21 ng/L: Negative  Repeat testing should be performed if clinically indicated.     Female and children under 18 years old 14-50 ng/L; Male 21-50 ng/L:  Consistent with possible cardiac damage and possible increased clinical   risk. Serial measurements may help to assess extent of myocardial damage.     >50 ng/L: Consistent with cardiac damage, increased clinical risk and  myocardial infarction. Serial measurements may help assess extent of   myocardial damage.      NOTE: Children less than 1 year old may have higher baseline troponin   levels and results should be interpreted in conjunction with the overall   clinical context.     NOTE: Troponin I testing is performed using a different   testing methodology at [facility] than at other   [facility]. Direct result comparisons should only   be made within the same method.   URINE CULTURE   URINALYSIS WITH REFLEX CULTURE AND MICROSCOPIC    Narrative:     The following orders were created for panel order Urinalysis with Reflex Culture and Microscopic.  Procedure                               Abnormality         Status                     ---------                               -----------         ------                     Urinalysis with Reflex C...[[id number]]  Abnormal            Final result               Extra Urine Gray Tube[[id number]]                            In process                    Please view results for these tests on the individual orders.   EXTRA URINE GRAY TUBE       XR chest 1 view   Final Result   Prominent cardiac silhouette. Somewhat limited resolution.        MACRO:   none        Signed by: Tracy Banks 8/20/2024 10:55 AM   Dictation workstation:   [id number]      CT head wo IV contrast   Final Result   Mild age related changes without acute intracranial process.        Signed by: Sebastian Kimble 8/20/2024 10:55 AM   Dictation workstation:   [id number]      CT abdomen pelvis w IV contrast   Final Result   No acute abnormality of the abdomen or pelvis. Nonobstructing right   renal calculus.        Question of fecal impaction.        MACRO:   none        Signed by: Tracy Banks 8/20/2024 10:53 AM   Dictation workstation:   [id number]          Procedure  Procedures     Madhuri Lynn, KIMBERLY-CNP  08/20/24 0286

## 2024-08-21 ENCOUNTER — APPOINTMENT (OUTPATIENT)
Dept: RADIOLOGY | Facility: HOSPITAL | Age: 84
DRG: 884 | End: 2024-08-21
Payer: MEDICARE

## 2024-08-21 ENCOUNTER — TELEPHONE (OUTPATIENT)
Dept: PRIMARY CARE | Facility: CLINIC | Age: 84
End: 2024-08-21

## 2024-08-21 ENCOUNTER — HOSPITAL ENCOUNTER (INPATIENT)
Facility: HOSPITAL | Age: 84
LOS: 3 days | Discharge: NURSING, REHAB OR LONG-TERM CARE | DRG: 884 | End: 2024-08-24
Attending: INTERNAL MEDICINE | Admitting: INTERNAL MEDICINE
Payer: MEDICARE

## 2024-08-21 DIAGNOSIS — N30.00 ACUTE CYSTITIS WITHOUT HEMATURIA: ICD-10-CM

## 2024-08-21 DIAGNOSIS — G93.40 ACUTE ENCEPHALOPATHY: Primary | ICD-10-CM

## 2024-08-21 DIAGNOSIS — I10 PRIMARY HYPERTENSION: ICD-10-CM

## 2024-08-21 DIAGNOSIS — K59.00 CONSTIPATION, UNSPECIFIED CONSTIPATION TYPE: ICD-10-CM

## 2024-08-21 DIAGNOSIS — R41.82 ALTERED MENTAL STATUS, UNSPECIFIED ALTERED MENTAL STATUS TYPE: ICD-10-CM

## 2024-08-21 LAB
ALBUMIN SERPL BCP-MCNC: 3.5 G/DL (ref 3.4–5)
ALP SERPL-CCNC: 80 U/L (ref 33–136)
ALT SERPL W P-5'-P-CCNC: 7 U/L (ref 7–45)
ANION GAP SERPL CALC-SCNC: 17 MMOL/L (ref 10–20)
AST SERPL W P-5'-P-CCNC: 19 U/L (ref 9–39)
BACTERIA UR CULT: NORMAL
BASOPHILS # BLD AUTO: 0.02 X10*3/UL (ref 0–0.1)
BASOPHILS NFR BLD AUTO: 0.4 %
BILIRUB SERPL-MCNC: 0.8 MG/DL (ref 0–1.2)
BUN SERPL-MCNC: 15 MG/DL (ref 6–23)
CALCIUM SERPL-MCNC: 8.5 MG/DL (ref 8.6–10.3)
CHLORIDE SERPL-SCNC: 108 MMOL/L (ref 98–107)
CO2 SERPL-SCNC: 18 MMOL/L (ref 21–32)
CREAT SERPL-MCNC: 1.22 MG/DL (ref 0.5–1.05)
EGFRCR SERPLBLD CKD-EPI 2021: 44 ML/MIN/1.73M*2
EOSINOPHIL # BLD AUTO: 0.21 X10*3/UL (ref 0–0.4)
EOSINOPHIL NFR BLD AUTO: 4.3 %
ERYTHROCYTE [DISTWIDTH] IN BLOOD BY AUTOMATED COUNT: 13.6 % (ref 11.5–14.5)
GLUCOSE BLD MANUAL STRIP-MCNC: 94 MG/DL (ref 74–99)
GLUCOSE SERPL-MCNC: 72 MG/DL (ref 74–99)
HCT VFR BLD AUTO: 42.3 % (ref 36–46)
HGB BLD-MCNC: 13.5 G/DL (ref 12–16)
IMM GRANULOCYTES # BLD AUTO: 0.01 X10*3/UL (ref 0–0.5)
IMM GRANULOCYTES NFR BLD AUTO: 0.2 % (ref 0–0.9)
LACTATE SERPL-SCNC: 1.8 MMOL/L (ref 0.4–2)
LYMPHOCYTES # BLD AUTO: 1.08 X10*3/UL (ref 0.8–3)
LYMPHOCYTES NFR BLD AUTO: 21.9 %
MCH RBC QN AUTO: 31 PG (ref 26–34)
MCHC RBC AUTO-ENTMCNC: 31.9 G/DL (ref 32–36)
MCV RBC AUTO: 97 FL (ref 80–100)
MONOCYTES # BLD AUTO: 0.56 X10*3/UL (ref 0.05–0.8)
MONOCYTES NFR BLD AUTO: 11.3 %
NEUTROPHILS # BLD AUTO: 3.06 X10*3/UL (ref 1.6–5.5)
NEUTROPHILS NFR BLD AUTO: 61.9 %
NRBC BLD-RTO: 0 /100 WBCS (ref 0–0)
PLATELET # BLD AUTO: 181 X10*3/UL (ref 150–450)
POTASSIUM SERPL-SCNC: 3.8 MMOL/L (ref 3.5–5.3)
PROT SERPL-MCNC: 6.6 G/DL (ref 6.4–8.2)
RBC # BLD AUTO: 4.36 X10*6/UL (ref 4–5.2)
SODIUM SERPL-SCNC: 139 MMOL/L (ref 136–145)
TSH SERPL-ACNC: 2.91 MIU/L (ref 0.44–3.98)
WBC # BLD AUTO: 4.9 X10*3/UL (ref 4.4–11.3)

## 2024-08-21 PROCEDURE — 74018 RADEX ABDOMEN 1 VIEW: CPT

## 2024-08-21 PROCEDURE — 85025 COMPLETE CBC W/AUTO DIFF WBC: CPT | Performed by: NURSE PRACTITIONER

## 2024-08-21 PROCEDURE — 2500000005 HC RX 250 GENERAL PHARMACY W/O HCPCS: Performed by: NURSE PRACTITIONER

## 2024-08-21 PROCEDURE — 99223 1ST HOSP IP/OBS HIGH 75: CPT | Performed by: INTERNAL MEDICINE

## 2024-08-21 PROCEDURE — 80053 COMPREHEN METABOLIC PANEL: CPT | Performed by: NURSE PRACTITIONER

## 2024-08-21 PROCEDURE — 99285 EMERGENCY DEPT VISIT HI MDM: CPT | Mod: 25

## 2024-08-21 PROCEDURE — 82947 ASSAY GLUCOSE BLOOD QUANT: CPT

## 2024-08-21 PROCEDURE — 83605 ASSAY OF LACTIC ACID: CPT | Performed by: NURSE PRACTITIONER

## 2024-08-21 PROCEDURE — 36415 COLL VENOUS BLD VENIPUNCTURE: CPT | Performed by: NURSE PRACTITIONER

## 2024-08-21 PROCEDURE — 1210000001 HC SEMI-PRIVATE ROOM DAILY

## 2024-08-21 PROCEDURE — 2500000004 HC RX 250 GENERAL PHARMACY W/ HCPCS (ALT 636 FOR OP/ED): Performed by: NURSE PRACTITIONER

## 2024-08-21 PROCEDURE — 96365 THER/PROPH/DIAG IV INF INIT: CPT

## 2024-08-21 PROCEDURE — 74018 RADEX ABDOMEN 1 VIEW: CPT | Performed by: RADIOLOGY

## 2024-08-21 RX ORDER — CETIRIZINE HYDROCHLORIDE 10 MG/1
10 TABLET ORAL DAILY
Status: DISCONTINUED | OUTPATIENT
Start: 2024-08-22 | End: 2024-08-24 | Stop reason: HOSPADM

## 2024-08-21 RX ORDER — DEXTROSE 50 % IN WATER (D50W) INTRAVENOUS SYRINGE
25 ONCE
Status: COMPLETED | OUTPATIENT
Start: 2024-08-21 | End: 2024-08-21

## 2024-08-21 RX ORDER — MINERAL OIL
180 ENEMA (ML) RECTAL DAILY
COMMUNITY

## 2024-08-21 RX ORDER — ASPIRIN 81 MG/1
81 TABLET ORAL DAILY
Status: DISCONTINUED | OUTPATIENT
Start: 2024-08-22 | End: 2024-08-24 | Stop reason: HOSPADM

## 2024-08-21 RX ORDER — LEVOTHYROXINE SODIUM 25 UG/1
25 TABLET ORAL DAILY
Status: DISCONTINUED | OUTPATIENT
Start: 2024-08-22 | End: 2024-08-24 | Stop reason: HOSPADM

## 2024-08-21 RX ORDER — SENNOSIDES 8.6 MG/1
1 TABLET ORAL DAILY
Status: DISCONTINUED | OUTPATIENT
Start: 2024-08-21 | End: 2024-08-24 | Stop reason: HOSPADM

## 2024-08-21 RX ORDER — ONDANSETRON HYDROCHLORIDE 2 MG/ML
4 INJECTION, SOLUTION INTRAVENOUS EVERY 8 HOURS PRN
Status: DISCONTINUED | OUTPATIENT
Start: 2024-08-21 | End: 2024-08-24 | Stop reason: HOSPADM

## 2024-08-21 RX ORDER — HALOPERIDOL 5 MG/ML
2 INJECTION INTRAMUSCULAR ONCE
Status: DISCONTINUED | OUTPATIENT
Start: 2024-08-21 | End: 2024-08-22

## 2024-08-21 RX ORDER — ACETAMINOPHEN 325 MG/1
650 TABLET ORAL EVERY 4 HOURS PRN
Status: DISCONTINUED | OUTPATIENT
Start: 2024-08-21 | End: 2024-08-24 | Stop reason: HOSPADM

## 2024-08-21 RX ORDER — POLYETHYLENE GLYCOL 3350 17 G/17G
17 POWDER, FOR SOLUTION ORAL DAILY PRN
Status: DISCONTINUED | OUTPATIENT
Start: 2024-08-21 | End: 2024-08-24 | Stop reason: HOSPADM

## 2024-08-21 RX ORDER — LORAZEPAM 2 MG/ML
1 INJECTION INTRAMUSCULAR ONCE
Status: COMPLETED | OUTPATIENT
Start: 2024-08-21 | End: 2024-08-21

## 2024-08-21 RX ORDER — IPRATROPIUM BROMIDE AND ALBUTEROL SULFATE 2.5; .5 MG/3ML; MG/3ML
3 SOLUTION RESPIRATORY (INHALATION) EVERY 4 HOURS PRN
Status: DISCONTINUED | OUTPATIENT
Start: 2024-08-21 | End: 2024-08-24 | Stop reason: HOSPADM

## 2024-08-21 RX ORDER — HEPARIN SODIUM 5000 [USP'U]/ML
7500 INJECTION, SOLUTION INTRAVENOUS; SUBCUTANEOUS EVERY 8 HOURS SCHEDULED
Status: DISCONTINUED | OUTPATIENT
Start: 2024-08-21 | End: 2024-08-24 | Stop reason: HOSPADM

## 2024-08-21 RX ORDER — CEFTRIAXONE 1 G/50ML
1 INJECTION, SOLUTION INTRAVENOUS ONCE
Status: COMPLETED | OUTPATIENT
Start: 2024-08-21 | End: 2024-08-21

## 2024-08-21 ASSESSMENT — PAIN SCALES - GENERAL
PAINLEVEL_OUTOF10: 0 - NO PAIN

## 2024-08-21 ASSESSMENT — PAIN - FUNCTIONAL ASSESSMENT: PAIN_FUNCTIONAL_ASSESSMENT: 0-10

## 2024-08-21 ASSESSMENT — COLUMBIA-SUICIDE SEVERITY RATING SCALE - C-SSRS
1. IN THE PAST MONTH, HAVE YOU WISHED YOU WERE DEAD OR WISHED YOU COULD GO TO SLEEP AND NOT WAKE UP?: NO
1. IN THE PAST MONTH, HAVE YOU WISHED YOU WERE DEAD OR WISHED YOU COULD GO TO SLEEP AND NOT WAKE UP?: NO
2. HAVE YOU ACTUALLY HAD ANY THOUGHTS OF KILLING YOURSELF?: NO
6. HAVE YOU EVER DONE ANYTHING, STARTED TO DO ANYTHING, OR PREPARED TO DO ANYTHING TO END YOUR LIFE?: NO
2. HAVE YOU ACTUALLY HAD ANY THOUGHTS OF KILLING YOURSELF?: NO
6. HAVE YOU EVER DONE ANYTHING, STARTED TO DO ANYTHING, OR PREPARED TO DO ANYTHING TO END YOUR LIFE?: NO

## 2024-08-21 ASSESSMENT — LIFESTYLE VARIABLES
EVER HAD A DRINK FIRST THING IN THE MORNING TO STEADY YOUR NERVES TO GET RID OF A HANGOVER: NO
EVER FELT BAD OR GUILTY ABOUT YOUR DRINKING: NO
TOTAL SCORE: 0
HAVE YOU EVER FELT YOU SHOULD CUT DOWN ON YOUR DRINKING: NO
HAVE PEOPLE ANNOYED YOU BY CRITICIZING YOUR DRINKING: NO

## 2024-08-21 NOTE — TELEPHONE ENCOUNTER
Patient's daughter calling with concern of change in mental status with patient.  She was seen in ER for bowel issues and given antibiotic for UTI.  States mom refusing to take medication has become slightly combative.  Patient advised ED for full eval.

## 2024-08-21 NOTE — H&P
Medical Group History and Physical    ASSESSMENT & PLAN:     Acute encephalopathy vs ?dementia - UA was suggestive of UTI, however Ucx had no growth. CT head noncon from 8/20 neg. Labs otherwise largely unremarkable. Unclear etiology at this time. May just be underlying undiagnosed dementia with behavioral disturbance, but after discussion with daughter this is a large departure from her baseline. She normally is A+Ox3 and verbal. Hold off on further abx until rpt UA with culture here. Rpt CT head. Check COVID. Check KUB. Bladder scan to check for retention. Check TSH, ammonia. PT/OT for possible placement. If still encephalopathic tomorrow, may consider neuro consult and further campos.     CKD3 - at baseline  Hypothyroidism - check TSH. Cont home synthroid  COPD - not in acute exacerbation, not taking home controller inhalers. PRN duonebs.     Vte ppx sqh    Lyle Stevens MD    HISTORY OF PRESENT ILLNESS:   Chief Complaint: confusion    History Of Present Illness:    84F with PMH of COPD, CKD3, HTN, hypothyroidism, GERD who is presenting with confusion. Pt is nonverbal for me. Unable to provide any history. No family at bedside. Bsaed on chart review, it seems pt was here for constipation in ED yesterday. Was diagnosed with UTI and prescribed Bactrim. However after returning home, pt became more agitated, was not taking her medications. Her PCP recommended returning to the ED for possible admission for IV abx.     In the ED, afeb, HDS, on room air. Labs showed Scr c/w CKD history, mild HAGMA. CXR unremarkable for acute findings. Received Rocephin. Admitted for further mgmt.     Review of systems: 10 point review of systems is otherwise negative except as mentioned above.    PAST HISTORIES:     Past Medical History:  She has a past medical history of Asymptomatic varicose veins of bilateral lower extremities (02/05/2020) and Morbid (severe) obesity with alveolar hypoventilation (Multi) (05/03/2021).    Past  Surgical History:  She has a past surgical history that includes Other surgical history (08/16/2019).      Social History:  She reports that she has quit smoking. Her smoking use included cigarettes. She has never used smokeless tobacco. No history on file for alcohol use and drug use.    Family History:  No family history on file.     Allergies:  Clindamycin, Erythromycin, and Penicillins    OBJECTIVE:     Last Recorded Vitals:  Vitals:    08/21/24 1424 08/21/24 1555   BP: 132/59 142/81   BP Location: Right arm Right arm   Patient Position: Sitting Lying   Pulse: 64 63   Resp: 18 18   Temp: 36.3 °C (97.3 °F)    TempSrc: Temporal    SpO2: (!) 92% 94%   Weight: 106 kg (234 lb)    Height: 1.524 m (5')        Last I/O:  No intake/output data recorded.    Physical Exam:  GEN: appears stated age, NAD  CV: RRR, no m/r/g, no LE edema  LUNGS: CTAB, no w/r/c  ABD: soft, NT  SKIN: no rashes  MSK; no gross deformities, normal joints  NEURO: not speaking, not following basic commands, moving all extremities spontaneously, no FND grossly  PSYCH: appropriate mood, affect    Scheduled Medications      PRN Medications      Continuous Medications      Outpatient Medications:  Prior to Admission medications    Medication Sig Start Date End Date Taking? Authorizing Provider   aspirin 81 mg EC tablet Take 1 tablet (81 mg) by mouth once daily. 8/16/19   Historical Provider, MD   chlorhexidine (Peridex) 0.12 % solution Use 15 mL in the mouth or throat once daily. 7/31/24   Evelyn Hopper DO   fexofenadine (Allegra) 180 mg tablet Take 1 tablet (180 mg) by mouth once daily.    Historical Provider, MD   furosemide (Lasix) 20 mg tablet Take 1 tablet (20 mg) by mouth once daily for 2 days.  Patient not taking: Reported on 8/21/2024 7/30/24 8/1/24  Evelyn Hopper DO   levothyroxine (Synthroid, Levoxyl) 25 mcg tablet TAKE 1 TABLET BY MOUTH DAILY 5/28/24   Evelyn Hopper DO   nystatin (Mycostatin) 100,000 unit/mL suspension Take 5 mL  (500,000 Units) by mouth 4 times a day. Swish in mouth and swallow. 7/30/24 9/28/24  Evelyn Hopper, DO   sennosides (Senokot) 8.6 mg tablet Take 1 tablet (8.6 mg) by mouth once daily for 2 doses. Take 1 tab tonight, if no BM by tomorrow afternoon take second pill 8/20/24 8/22/24  KIMBERLY Rice-CNP   sulfamethoxazole-trimethoprim (Bactrim DS) 800-160 mg tablet Take 1 tablet by mouth 2 times a day for 14 days. 8/20/24 9/3/24  KIMBERLY Rice-ELIZABETH   Trelegy Ellipta 100-62.5-25 mcg blister with device USE 1 INHALATION DAILY  Patient not taking: Reported on 8/21/2024 6/26/23   Evelyn Hopper, DO   albuterol 90 mcg/actuation inhaler Inhale 2 puffs every 4 hours if needed. 11/13/17 8/21/24  Historical Provider, MD   cetirizine (ZyrTEC) 10 mg tablet Take 1 tablet (10 mg) by mouth once daily at bedtime. 8/16/19 8/21/24  Historical Provider, MD   cholecalciferol (Vitamin D-3) 50 mcg (2,000 unit) capsule Take 1 capsule (50 mcg) by mouth once daily. 5/10/22 8/21/24  Historical Provider, MD   gabapentin (Neurontin) 100 mg capsule 1-2 capsules at night as needed 3/16/21 8/21/24  Historical Provider, MD   ipratropium-albuteroL (Duo-Neb) 0.5-2.5 mg/3 mL nebulizer solution Take 3 mL by nebulization 3 times a day as needed for wheezing or shortness of breath.  8/21/24  Historical Provider, MD   meloxicam (Mobic) 7.5 mg tablet Take 1 tablet (7.5 mg) by mouth 2 times a day as needed. 3/16/21 8/21/24  Historical Provider, MD       LABS AND IMAGING:     Labs:  Results for orders placed or performed during the hospital encounter of 08/21/24 (from the past 24 hour(s))   CBC and Auto Differential   Result Value Ref Range    WBC 4.9 4.4 - 11.3 x10*3/uL    nRBC 0.0 0.0 - 0.0 /100 WBCs    RBC 4.36 4.00 - 5.20 x10*6/uL    Hemoglobin 13.5 12.0 - 16.0 g/dL    Hematocrit 42.3 36.0 - 46.0 %    MCV 97 80 - 100 fL    MCH 31.0 26.0 - 34.0 pg    MCHC 31.9 (L) 32.0 - 36.0 g/dL    RDW 13.6 11.5 - 14.5 %    Platelets 181 150 -  450 x10*3/uL    Neutrophils % 61.9 40.0 - 80.0 %    Immature Granulocytes %, Automated 0.2 0.0 - 0.9 %    Lymphocytes % 21.9 13.0 - 44.0 %    Monocytes % 11.3 2.0 - 10.0 %    Eosinophils % 4.3 0.0 - 6.0 %    Basophils % 0.4 0.0 - 2.0 %    Neutrophils Absolute 3.06 1.60 - 5.50 x10*3/uL    Immature Granulocytes Absolute, Automated 0.01 0.00 - 0.50 x10*3/uL    Lymphocytes Absolute 1.08 0.80 - 3.00 x10*3/uL    Monocytes Absolute 0.56 0.05 - 0.80 x10*3/uL    Eosinophils Absolute 0.21 0.00 - 0.40 x10*3/uL    Basophils Absolute 0.02 0.00 - 0.10 x10*3/uL   Lactate   Result Value Ref Range    Lactate 1.8 0.4 - 2.0 mmol/L   Comprehensive metabolic panel   Result Value Ref Range    Glucose 72 (L) 74 - 99 mg/dL    Sodium 139 136 - 145 mmol/L    Potassium 3.8 3.5 - 5.3 mmol/L    Chloride 108 (H) 98 - 107 mmol/L    Bicarbonate 18 (L) 21 - 32 mmol/L    Anion Gap 17 10 - 20 mmol/L    Urea Nitrogen 15 6 - 23 mg/dL    Creatinine 1.22 (H) 0.50 - 1.05 mg/dL    eGFR 44 (L) >60 mL/min/1.73m*2    Calcium 8.5 (L) 8.6 - 10.3 mg/dL    Albumin 3.5 3.4 - 5.0 g/dL    Alkaline Phosphatase 80 33 - 136 U/L    Total Protein 6.6 6.4 - 8.2 g/dL    AST 19 9 - 39 U/L    Bilirubin, Total 0.8 0.0 - 1.2 mg/dL    ALT 7 7 - 45 U/L        Imaging:  XR chest 1 view  Narrative: Interpreted By:  Tracy Banks,   STUDY:  XR CHEST 1 VIEW;  8/20/2024 10:15 am      INDICATION:  Signs/Symptoms:confusion.      COMPARISON:  CT chest dated 08/20/2024; chest dated 01/24/2018      ACCESSION NUMBER(S):  [id number]      ORDERING CLINICIAN:  ISABELLE SCHUERGER      FINDINGS:  The cardiac silhouette appears enlarged.      There is no discrete consolidation or pleural fluid.      The mediastinum is unremarkable. The bones are not well seen.      COMPARISON OF FINDING:  The cardiac silhouette appears larger than on the prior exam.      Impression: Prominent cardiac silhouette. Somewhat limited resolution.      MACRO:  none      Signed by: Tracy Banks 8/20/2024 10:55  AM  Dictation workstation:   [id number]  CT head wo IV contrast  Narrative: Interpreted By:  Sebastian Kimble,   STUDY:  CT HEAD WO IV CONTRAST; 8/20/2024 10:14 am      INDICATION:  Signs/Symptoms:confusion      COMPARISON:  None      ACCESSION NUMBER(S):  [id number]      ORDERING CLINICIAN:  ISABELLE CHOU      TECHNIQUE:  Axial images were obtained through the brain. No IV contrast was  administered.      All CT examinations are performed with one or more of the following  dose reduction techniques: Automated Exposure Control, adjustment of  mA and/or kV according to patient size, or use of iterative  reconstruction techniques.      FINDINGS:  The ventricles are enlarged but midline in position, with  proportional prominence of the sulci, due to atrophy. Patchy  periventricular hypodensities are present suggestive of small vessel  ischemic white matter disease. There is no intracranial hemorrhage.  No mass or mass effect is seen.  The osseus structures are unremarkable.      Impression: Mild age related changes without acute intracranial process.      Signed by: Sebastian Kimble 8/20/2024 10:55 AM  Dictation workstation:   [id number]  CT abdomen pelvis w IV contrast  Narrative: Interpreted By:  Tracy Banks,   STUDY:  CT ABDOMEN PELVIS W IV CONTRAST;  8/20/2024 10:07 am      INDICATION:  Signs/Symptoms:Abdominal Pain.      COMPARISON:  12/26/2013      ACCESSION NUMBER(S):  [id number]      ORDERING CLINICIAN:  ISABELLE CHOU      TECHNIQUE:  CT of the abdomen and pelvis was performed following intravenous  administration 75 mL Omnipaque 350. Sagittal and coronal  reconstructions were generated.      FINDINGS:  LOWER CHEST:  There is a small hiatal hernia      ABDOMEN:      LIVER:  Hepatic cyst.      BILE DUCTS:  Unremarkable      GALLBLADDER:  There are no obvious gallstones.      PANCREAS:  Unremarkable      SPLEEN:  Unremarkable      ADRENAL GLANDS:  There are no discrete adrenal masses.      KIDNEYS  AND URETERS:  There is suggestion of parapelvic cysts. There is no hydronephrosis.  There is a nonobstructing lower pole right renal calculus.      PELVIS:      BLADDER:  Unremarkable      REPRODUCTIVE ORGANS:  The uterus and ovaries are visualized      BOWEL:  There is suggestion of a duodenal diverticulum. There may be fecal  impaction.      There is a normal caliber air-containing appendix.      VESSELS:  There are atherosclerotic changes of the aorta. The cava is  unremarkable.      PERITONEUM/RETROPERITONEUM/LYMPH NODES:  There is no free air or free fluid. There is presacral edema.      There is no significant lymphadenopathy.      BONES AND ABDOMINAL WALL:  There are degenerative changes of the spine.      COMPARISON OF FINDINGS:  Fecal impaction was not present previously.      Impression: No acute abnormality of the abdomen or pelvis. Nonobstructing right  renal calculus.      Question of fecal impaction.      MACRO:  none      Signed by: Tracy Banks 8/20/2024 10:53 AM  Dictation workstation:   [id number]  ECG 12 Lead  Sinus bradycardia  Nonspecific T wave abnormality  Abnormal ECG  When compared with ECG of 24-JAN-2018 17:29,  Nonspecific T wave abnormality, worse in Anterior leads  QT has shortened

## 2024-08-21 NOTE — ED PROVIDER NOTES
HPI   Chief Complaint   Patient presents with    Altered Mental Status     Was seen for constipation and diagnosed with UTI yesterday, altered and agitated today       Patient is an 84-year-old female who returns to the ED today due to noncompliance with antibiotics and increasing agitation and confusion at home.  Patient lives with her daughter who states she is not taking her medication as prescribed and has become more agitated and confused.  She called her primary doctor advised her to come back to the emergency department for admission for IV antibiotics for her UTI.  Family denies fever at home, nausea, vomiting.  Patient has no complaints at this time.      History provided by:  Patient  History limited by:  Mental status change   used: No            Patient History   Past Medical History:   Diagnosis Date    Asymptomatic varicose veins of bilateral lower extremities 02/05/2020    Varicose veins of legs    Morbid (severe) obesity with alveolar hypoventilation (Multi) 05/03/2021    Obesity hypoventilation syndrome     Past Surgical History:   Procedure Laterality Date    OTHER SURGICAL HISTORY  08/16/2019    Lipoma excision     No family history on file.  Social History     Tobacco Use    Smoking status: Former     Types: Cigarettes    Smokeless tobacco: Never   Substance Use Topics    Alcohol use: Not on file    Drug use: Not on file       Physical Exam   ED Triage Vitals [08/21/24 1424]   Temperature Heart Rate Respirations BP   36.3 °C (97.3 °F) 64 18 132/59      Pulse Ox Temp Source Heart Rate Source Patient Position   (!) 92 % Temporal -- Sitting      BP Location FiO2 (%)     Right arm --       Physical Exam  Vitals and nursing note reviewed.   Constitutional:       General: She is not in acute distress.     Appearance: She is well-developed.   HENT:      Head: Normocephalic and atraumatic.   Eyes:      Conjunctiva/sclera: Conjunctivae normal.   Cardiovascular:      Rate and Rhythm:  Normal rate and regular rhythm.      Heart sounds: No murmur heard.  Pulmonary:      Effort: Pulmonary effort is normal. No respiratory distress.      Breath sounds: Normal breath sounds.   Abdominal:      Palpations: Abdomen is soft.      Tenderness: There is no abdominal tenderness.   Musculoskeletal:         General: No swelling.      Cervical back: Neck supple.   Skin:     General: Skin is warm and dry.      Capillary Refill: Capillary refill takes less than 2 seconds.   Neurological:      Mental Status: She is alert.      GCS: GCS eye subscore is 4. GCS verbal subscore is 4. GCS motor subscore is 6.   Psychiatric:         Mood and Affect: Mood normal.           ED Course & MDM   ED Course as of 08/21/24 1659   Wed Aug 21, 2024   1622 CBC and Auto Differential(!)  CBC stable, no leukocytosis, anemia, or thrombocytopenia [WS]   1638 Lactate  Not elevated [WS]   1638 Comprehensive metabolic panel(!)  Mild hyperglycemia, no significant electrolyte normalities, creatinine mildly elevated around her baseline, no liver pathology.  Patient be given oral food and juice and CMP will be reassessed. [WS]      ED Course User Index  [WS] Alek Bernstein, APRN-CNP         Diagnoses as of 08/21/24 1659   Acute cystitis without hematuria   Altered mental status, unspecified altered mental status type   Acute encephalopathy                 No data recorded                                 Medical Decision Making  Differential diagnosis: Altered mental status, encephalopathy, dementia, noncompliance with medications, UTI.  Patient's vital signs do show mild hypoxia however remaining vital signs are stable, she is afebrile without tachycardia, I am not concerned for sepsis however patient is not taking her prescribed antibiotics despite UTI at home and has become more agitated, she will require admission for IV antibiotics and possible placement in SNF until symptoms improve.  Patient's lab work is essentially stable, lactate is  not elevated, no leukocytosis, anemia or thrombocytopenia.  CMP is stable.  Patient's urinalysis yesterday did appear to be infected however there is no growth on culture currently.  Will repeat urinalysis and also obtain head CT.  Patient was accepted for admission by Dr. Stevens due to patient's new altered mental status.      Amount and/or Complexity of Data Reviewed  Labs: ordered. Decision-making details documented in ED Course.    Risk  Prescription drug management.  Decision regarding hospitalization.        Procedure  Procedures     KIMBERLY Fischer-ELIZABETH  08/21/24 2340

## 2024-08-22 ENCOUNTER — APPOINTMENT (OUTPATIENT)
Dept: RADIOLOGY | Facility: HOSPITAL | Age: 84
DRG: 884 | End: 2024-08-22
Payer: MEDICARE

## 2024-08-22 LAB
AMMONIA PLAS-SCNC: 39 UMOL/L (ref 16–53)
AMPHETAMINES UR QL SCN: ABNORMAL
ANION GAP SERPL CALC-SCNC: 13 MMOL/L (ref 10–20)
APPEARANCE UR: CLEAR
BARBITURATES UR QL SCN: ABNORMAL
BENZODIAZ UR QL SCN: ABNORMAL
BILIRUB UR STRIP.AUTO-MCNC: NEGATIVE MG/DL
BUN SERPL-MCNC: 13 MG/DL (ref 6–23)
BZE UR QL SCN: ABNORMAL
CALCIUM SERPL-MCNC: 8.3 MG/DL (ref 8.6–10.3)
CANNABINOIDS UR QL SCN: ABNORMAL
CHLORIDE SERPL-SCNC: 108 MMOL/L (ref 98–107)
CO2 SERPL-SCNC: 22 MMOL/L (ref 21–32)
COLOR UR: YELLOW
CREAT SERPL-MCNC: 1.04 MG/DL (ref 0.5–1.05)
EGFRCR SERPLBLD CKD-EPI 2021: 53 ML/MIN/1.73M*2
FENTANYL+NORFENTANYL UR QL SCN: ABNORMAL
GLUCOSE BLD MANUAL STRIP-MCNC: 112 MG/DL (ref 74–99)
GLUCOSE SERPL-MCNC: 71 MG/DL (ref 74–99)
GLUCOSE UR STRIP.AUTO-MCNC: NORMAL MG/DL
HOLD SPECIMEN: NORMAL
HOLD SPECIMEN: NORMAL
KETONES UR STRIP.AUTO-MCNC: ABNORMAL MG/DL
LEUKOCYTE ESTERASE UR QL STRIP.AUTO: NEGATIVE
METHADONE UR QL SCN: ABNORMAL
MUCOUS THREADS #/AREA URNS AUTO: NORMAL /LPF
NITRITE UR QL STRIP.AUTO: NEGATIVE
OPIATES UR QL SCN: ABNORMAL
OXYCODONE+OXYMORPHONE UR QL SCN: ABNORMAL
PCP UR QL SCN: ABNORMAL
PH UR STRIP.AUTO: 5.5 [PH]
POTASSIUM SERPL-SCNC: 4.8 MMOL/L (ref 3.5–5.3)
PROCALCITONIN SERPL-MCNC: 0.04 NG/ML
PROT UR STRIP.AUTO-MCNC: ABNORMAL MG/DL
RBC # UR STRIP.AUTO: NEGATIVE /UL
RBC #/AREA URNS AUTO: NORMAL /HPF
SODIUM SERPL-SCNC: 138 MMOL/L (ref 136–145)
SP GR UR STRIP.AUTO: 1.03
SQUAMOUS #/AREA URNS AUTO: NORMAL /HPF
UROBILINOGEN UR STRIP.AUTO-MCNC: ABNORMAL MG/DL
WBC #/AREA URNS AUTO: NORMAL /HPF

## 2024-08-22 PROCEDURE — 84145 PROCALCITONIN (PCT): CPT | Mod: ELYLAB | Performed by: INTERNAL MEDICINE

## 2024-08-22 PROCEDURE — 70450 CT HEAD/BRAIN W/O DYE: CPT

## 2024-08-22 PROCEDURE — 2500000002 HC RX 250 W HCPCS SELF ADMINISTERED DRUGS (ALT 637 FOR MEDICARE OP, ALT 636 FOR OP/ED): Performed by: INTERNAL MEDICINE

## 2024-08-22 PROCEDURE — 51701 INSERT BLADDER CATHETER: CPT

## 2024-08-22 PROCEDURE — 2500000001 HC RX 250 WO HCPCS SELF ADMINISTERED DRUGS (ALT 637 FOR MEDICARE OP): Performed by: INTERNAL MEDICINE

## 2024-08-22 PROCEDURE — 1210000001 HC SEMI-PRIVATE ROOM DAILY

## 2024-08-22 PROCEDURE — 36415 COLL VENOUS BLD VENIPUNCTURE: CPT | Performed by: INTERNAL MEDICINE

## 2024-08-22 PROCEDURE — 70450 CT HEAD/BRAIN W/O DYE: CPT | Performed by: RADIOLOGY

## 2024-08-22 PROCEDURE — 80048 BASIC METABOLIC PNL TOTAL CA: CPT | Performed by: INTERNAL MEDICINE

## 2024-08-22 PROCEDURE — 80307 DRUG TEST PRSMV CHEM ANLYZR: CPT | Performed by: INTERNAL MEDICINE

## 2024-08-22 PROCEDURE — 97161 PT EVAL LOW COMPLEX 20 MIN: CPT | Mod: GP

## 2024-08-22 PROCEDURE — 82947 ASSAY GLUCOSE BLOOD QUANT: CPT

## 2024-08-22 PROCEDURE — 2500000004 HC RX 250 GENERAL PHARMACY W/ HCPCS (ALT 636 FOR OP/ED): Performed by: INTERNAL MEDICINE

## 2024-08-22 PROCEDURE — 81001 URINALYSIS AUTO W/SCOPE: CPT | Performed by: NURSE PRACTITIONER

## 2024-08-22 PROCEDURE — 82140 ASSAY OF AMMONIA: CPT | Performed by: INTERNAL MEDICINE

## 2024-08-22 PROCEDURE — 99232 SBSQ HOSP IP/OBS MODERATE 35: CPT | Performed by: INTERNAL MEDICINE

## 2024-08-22 PROCEDURE — 97165 OT EVAL LOW COMPLEX 30 MIN: CPT | Mod: GO

## 2024-08-22 RX ORDER — HALOPERIDOL 5 MG/ML
2 INJECTION INTRAMUSCULAR ONCE
Status: COMPLETED | OUTPATIENT
Start: 2024-08-22 | End: 2024-08-22

## 2024-08-22 RX ORDER — QUETIAPINE FUMARATE 25 MG/1
25 TABLET, FILM COATED ORAL NIGHTLY
Status: DISCONTINUED | OUTPATIENT
Start: 2024-08-22 | End: 2024-08-24 | Stop reason: HOSPADM

## 2024-08-22 RX ORDER — AMLODIPINE BESYLATE 5 MG/1
5 TABLET ORAL DAILY
Status: DISCONTINUED | OUTPATIENT
Start: 2024-08-22 | End: 2024-08-24 | Stop reason: HOSPADM

## 2024-08-22 RX ORDER — HYDRALAZINE HYDROCHLORIDE 20 MG/ML
10 INJECTION INTRAMUSCULAR; INTRAVENOUS EVERY 4 HOURS PRN
Status: DISCONTINUED | OUTPATIENT
Start: 2024-08-22 | End: 2024-08-24 | Stop reason: HOSPADM

## 2024-08-22 RX ORDER — DIPHENHYDRAMINE HYDROCHLORIDE 50 MG/ML
25 INJECTION INTRAMUSCULAR; INTRAVENOUS ONCE
Status: COMPLETED | OUTPATIENT
Start: 2024-08-22 | End: 2024-08-22

## 2024-08-22 SDOH — HEALTH STABILITY: MENTAL HEALTH: HOW MANY STANDARD DRINKS CONTAINING ALCOHOL DO YOU HAVE ON A TYPICAL DAY?: PATIENT DOES NOT DRINK

## 2024-08-22 SDOH — HEALTH STABILITY: MENTAL HEALTH: HOW OFTEN DO YOU HAVE 6 OR MORE DRINKS ON ONE OCCASION?: NEVER

## 2024-08-22 SDOH — ECONOMIC STABILITY: INCOME INSECURITY: IN THE LAST 12 MONTHS, WAS THERE A TIME WHEN YOU WERE NOT ABLE TO PAY THE MORTGAGE OR RENT ON TIME?: NO

## 2024-08-22 SDOH — HEALTH STABILITY: MENTAL HEALTH: HOW OFTEN DO YOU HAVE A DRINK CONTAINING ALCOHOL?: NEVER

## 2024-08-22 SDOH — ECONOMIC STABILITY: HOUSING INSECURITY: IN THE PAST 12 MONTHS, HOW MANY TIMES HAVE YOU MOVED WHERE YOU WERE LIVING?: 1

## 2024-08-22 SDOH — ECONOMIC STABILITY: HOUSING INSECURITY: AT ANY TIME IN THE PAST 12 MONTHS, WERE YOU HOMELESS OR LIVING IN A SHELTER (INCLUDING NOW)?: NO

## 2024-08-22 SDOH — ECONOMIC STABILITY: INCOME INSECURITY: HOW HARD IS IT FOR YOU TO PAY FOR THE VERY BASICS LIKE FOOD, HOUSING, MEDICAL CARE, AND HEATING?: NOT VERY HARD

## 2024-08-22 SDOH — ECONOMIC STABILITY: TRANSPORTATION INSECURITY
IN THE PAST 12 MONTHS, HAS LACK OF TRANSPORTATION KEPT YOU FROM MEETINGS, WORK, OR FROM GETTING THINGS NEEDED FOR DAILY LIVING?: NO

## 2024-08-22 SDOH — ECONOMIC STABILITY: TRANSPORTATION INSECURITY
IN THE PAST 12 MONTHS, HAS THE LACK OF TRANSPORTATION KEPT YOU FROM MEDICAL APPOINTMENTS OR FROM GETTING MEDICATIONS?: NO

## 2024-08-22 ASSESSMENT — COGNITIVE AND FUNCTIONAL STATUS - GENERAL
MOVING TO AND FROM BED TO CHAIR: A LITTLE
MOVING FROM LYING ON BACK TO SITTING ON SIDE OF FLAT BED WITH BEDRAILS: A LITTLE
MOBILITY SCORE: 18
DRESSING REGULAR LOWER BODY CLOTHING: A LOT
DRESSING REGULAR LOWER BODY CLOTHING: A LITTLE
DAILY ACTIVITIY SCORE: 18
TOILETING: A LITTLE
CLIMB 3 TO 5 STEPS WITH RAILING: A LITTLE
PERSONAL GROOMING: A LITTLE
WALKING IN HOSPITAL ROOM: A LITTLE
CLIMB 3 TO 5 STEPS WITH RAILING: A LITTLE
PERSONAL GROOMING: A LITTLE
PERSONAL GROOMING: A LOT
TURNING FROM BACK TO SIDE WHILE IN FLAT BAD: A LITTLE
PATIENT BASELINE BEDBOUND: NO
DRESSING REGULAR UPPER BODY CLOTHING: A LITTLE
TURNING FROM BACK TO SIDE WHILE IN FLAT BAD: A LOT
MOVING TO AND FROM BED TO CHAIR: A LOT
CLIMB 3 TO 5 STEPS WITH RAILING: A LITTLE
EATING MEALS: A LITTLE
DAILY ACTIVITIY SCORE: 18
MOVING TO AND FROM BED TO CHAIR: A LITTLE
STANDING UP FROM CHAIR USING ARMS: A LITTLE
MOBILITY SCORE: 12
TOILETING: A LOT
TOILETING: A LITTLE
MOBILITY SCORE: 18
EATING MEALS: A LITTLE
DRESSING REGULAR LOWER BODY CLOTHING: A LITTLE
MOVING TO AND FROM BED TO CHAIR: A LITTLE
DAILY ACTIVITIY SCORE: 18
PERSONAL GROOMING: A LITTLE
HELP NEEDED FOR BATHING: A LITTLE
TURNING FROM BACK TO SIDE WHILE IN FLAT BAD: A LITTLE
DRESSING REGULAR UPPER BODY CLOTHING: A LOT
HELP NEEDED FOR BATHING: A LITTLE
HELP NEEDED FOR BATHING: A LITTLE
MOVING FROM LYING ON BACK TO SITTING ON SIDE OF FLAT BED WITH BEDRAILS: A LITTLE
STANDING UP FROM CHAIR USING ARMS: A LOT
WALKING IN HOSPITAL ROOM: A LOT
DRESSING REGULAR UPPER BODY CLOTHING: A LITTLE
CLIMB 3 TO 5 STEPS WITH RAILING: A LOT
HELP NEEDED FOR BATHING: A LOT
TURNING FROM BACK TO SIDE WHILE IN FLAT BAD: A LITTLE
WALKING IN HOSPITAL ROOM: A LITTLE
DAILY ACTIVITIY SCORE: 13
TOILETING: A LITTLE
EATING MEALS: A LITTLE
MOBILITY SCORE: 18
STANDING UP FROM CHAIR USING ARMS: A LITTLE
STANDING UP FROM CHAIR USING ARMS: A LITTLE
MOVING FROM LYING ON BACK TO SITTING ON SIDE OF FLAT BED WITH BEDRAILS: A LOT
DRESSING REGULAR LOWER BODY CLOTHING: A LITTLE
WALKING IN HOSPITAL ROOM: A LITTLE
DRESSING REGULAR UPPER BODY CLOTHING: A LITTLE
MOVING FROM LYING ON BACK TO SITTING ON SIDE OF FLAT BED WITH BEDRAILS: A LITTLE
EATING MEALS: A LITTLE

## 2024-08-22 ASSESSMENT — ACTIVITIES OF DAILY LIVING (ADL)
BATHING: INDEPENDENT
FEEDING YOURSELF: INDEPENDENT
HEARING - LEFT EAR: FUNCTIONAL
ADEQUATE_TO_COMPLETE_ADL: NO
FEEDING YOURSELF: NEEDS ASSISTANCE
GROOMING: NEEDS ASSISTANCE
PATIENT'S MEMORY ADEQUATE TO SAFELY COMPLETE DAILY ACTIVITIES?: NO
GROOMING: INDEPENDENT
ASSISTIVE_DEVICE: DENTURES PARTIAL;EYEGLASSES;WALKER
TOILETING: NEEDS ASSISTANCE
JUDGMENT_ADEQUATE_SAFELY_COMPLETE_DAILY_ACTIVITIES: YES
TOILETING: INDEPENDENT
ADEQUATE_TO_COMPLETE_ADL: YES
WALKS IN HOME: INDEPENDENT
HEARING - RIGHT EAR: FUNCTIONAL
HEARING - LEFT EAR: FUNCTIONAL
WALKS IN HOME: NEEDS ASSISTANCE
DRESSING YOURSELF: NEEDS ASSISTANCE
DRESSING YOURSELF: INDEPENDENT
PATIENT'S MEMORY ADEQUATE TO SAFELY COMPLETE DAILY ACTIVITIES?: YES
BATHING: NEEDS ASSISTANCE
JUDGMENT_ADEQUATE_SAFELY_COMPLETE_DAILY_ACTIVITIES: NO
BATHING_ASSISTANCE: MAXIMAL
ASSISTIVE_DEVICE: EYEGLASSES
HEARING - RIGHT EAR: FUNCTIONAL

## 2024-08-22 ASSESSMENT — PAIN SCALES - GENERAL
PAINLEVEL_OUTOF10: 0 - NO PAIN

## 2024-08-22 ASSESSMENT — PAIN - FUNCTIONAL ASSESSMENT
PAIN_FUNCTIONAL_ASSESSMENT: 0-10
PAIN_FUNCTIONAL_ASSESSMENT: 0-10

## 2024-08-22 ASSESSMENT — LIFESTYLE VARIABLES
AUDIT-C TOTAL SCORE: 0
SKIP TO QUESTIONS 9-10: 1

## 2024-08-22 NOTE — PROGRESS NOTES
08/22/24 1143   Discharge Planning   Living Arrangements Children   Support Systems Children;Family members   Assistance Needed PTA - pt lives with her dgt who helps care for her. Baseline is independent for mobility.   Type of Residence Private residence   Do you have animals or pets at home? No   Home or Post Acute Services In home services   Type of Home Care Services Home PT;Home OT;Home nursing visits   Expected Discharge Disposition HH Services   Does the patient need discharge transport arranged? No   Patient Choice   Provider Choice list and CMS website (https://medicare.gov/care-compare#search) for post-acute Quality and Resource Measure Data were provided and reviewed with: Family;Patient   Patient / Family choosing to utilize agency / facility established prior to hospitalization Yes     Pt seen in the ER the day prior for c/o abd pain and constipation and was found with UTI, sent home with Bactrim. Pt's dgt brought her back to the ER and reports the pt would not take her medications at home and she was becoming more agitated and confused at home. At baseline she is independent with mobility. Her dgt was trying to work with PCP about Dementia diagnosis outpt. PT/OT estelita jacobs. Was active with [facility] PTA for therapy. [facility].

## 2024-08-22 NOTE — CARE PLAN
The patient's goals for the shift include      The clinical goals for the shift include  Remain HDS throughout shift

## 2024-08-22 NOTE — PROGRESS NOTES
ASSESSMENT & PLAN:     Acute encephalopathy vs ?dementia with behavior disturbance - UA neg for UTI, recent cx neg as well. CT head noncon from 8/20 neg. Labs otherwise largely unremarkable. UDS unremarkable. KUB did not show sig constipation or fecal impaction. Unclear etiology at this time. May just be underlying undiagnosed dementia with behavioral disturbance. She normally is A+Ox3 and verbal. She is improved today, seems to be close to her baseline now. Rpt CT head to ensure no changes. Bladder scan PRN to ensure no retention. PT/OT eval done, will need SNF. Daughter agreeable. Will start low dose seroquel at bedtime while here.     HTN - start amlodipine  CKD3 - at baseline  Hypothyroidism - TSH wnl here. Cont home synthroid  COPD - not in acute exacerbation, not taking home controller inhalers. PRN duonebs.      Vte ppx sqh    Lyle Stevens MD    SUBJECTIVE     Overnight was more agitated requiring haldol, benadryl, and briefly restraints. More calm this morning, alert, oriented to self and place. Does not know date. Out of restraints now. Cooperative. Angry about being here.     OBJECTIVE:       Last Recorded Vitals:  Vitals:    08/22/24 0326 08/22/24 0532 08/22/24 0834 08/22/24 0959   BP: (!) 185/82 178/77 149/68    BP Location:       Patient Position:       Pulse: 69 65 62    Resp:       Temp:   36.7 °C (98.1 °F)    TempSrc:       SpO2: 92%  92%    Weight:       Height:    1.524 m (5')       Last I/O:  No intake/output data recorded.    Physical Exam:  GEN: appears stated age, NAD  CV: RRR, no m/r/g, trace LE edema  LUNGS: CTAB, no w/r/c  ABD: soft, NT, ND, NBS  SKIN: no rashes  MSK; no gross deformities, normal joints  NEURO: A+Ox2, no FND  PSYCH: appropriate mood, affec    Inpatient Medications:  aspirin, 81 mg, oral, Daily  cetirizine, 10 mg, oral, Daily  heparin (porcine), 7,500 Units, subcutaneous, q8h SIOMARA  levothyroxine, 25 mcg, oral, Daily  QUEtiapine, 25 mg, oral, Nightly  sennosides, 1 tablet,  oral, Daily        PRN Medications  PRN medications: acetaminophen, hydrALAZINE, ipratropium-albuteroL, ondansetron, polyethylene glycol    Continuous Medications:         LABS AND IMAGING:     Labs:  Results for orders placed or performed during the hospital encounter of 08/21/24 (from the past 24 hour(s))   CBC and Auto Differential   Result Value Ref Range    WBC 4.9 4.4 - 11.3 x10*3/uL    nRBC 0.0 0.0 - 0.0 /100 WBCs    RBC 4.36 4.00 - 5.20 x10*6/uL    Hemoglobin 13.5 12.0 - 16.0 g/dL    Hematocrit 42.3 36.0 - 46.0 %    MCV 97 80 - 100 fL    MCH 31.0 26.0 - 34.0 pg    MCHC 31.9 (L) 32.0 - 36.0 g/dL    RDW 13.6 11.5 - 14.5 %    Platelets 181 150 - 450 x10*3/uL    Neutrophils % 61.9 40.0 - 80.0 %    Immature Granulocytes %, Automated 0.2 0.0 - 0.9 %    Lymphocytes % 21.9 13.0 - 44.0 %    Monocytes % 11.3 2.0 - 10.0 %    Eosinophils % 4.3 0.0 - 6.0 %    Basophils % 0.4 0.0 - 2.0 %    Neutrophils Absolute 3.06 1.60 - 5.50 x10*3/uL    Immature Granulocytes Absolute, Automated 0.01 0.00 - 0.50 x10*3/uL    Lymphocytes Absolute 1.08 0.80 - 3.00 x10*3/uL    Monocytes Absolute 0.56 0.05 - 0.80 x10*3/uL    Eosinophils Absolute 0.21 0.00 - 0.40 x10*3/uL    Basophils Absolute 0.02 0.00 - 0.10 x10*3/uL   Lactate   Result Value Ref Range    Lactate 1.8 0.4 - 2.0 mmol/L   Comprehensive metabolic panel   Result Value Ref Range    Glucose 72 (L) 74 - 99 mg/dL    Sodium 139 136 - 145 mmol/L    Potassium 3.8 3.5 - 5.3 mmol/L    Chloride 108 (H) 98 - 107 mmol/L    Bicarbonate 18 (L) 21 - 32 mmol/L    Anion Gap 17 10 - 20 mmol/L    Urea Nitrogen 15 6 - 23 mg/dL    Creatinine 1.22 (H) 0.50 - 1.05 mg/dL    eGFR 44 (L) >60 mL/min/1.73m*2    Calcium 8.5 (L) 8.6 - 10.3 mg/dL    Albumin 3.5 3.4 - 5.0 g/dL    Alkaline Phosphatase 80 33 - 136 U/L    Total Protein 6.6 6.4 - 8.2 g/dL    AST 19 9 - 39 U/L    Bilirubin, Total 0.8 0.0 - 1.2 mg/dL    ALT 7 7 - 45 U/L   POCT GLUCOSE   Result Value Ref Range    POCT Glucose 94 74 - 99 mg/dL    Urinalysis with Reflex Culture and Microscopic   Result Value Ref Range    Color, Urine Yellow Light-Yellow, Yellow, Dark-Yellow    Appearance, Urine Clear Clear    Specific Gravity, Urine 1.035 1.005 - 1.035    pH, Urine 5.5 5.0, 5.5, 6.0, 6.5, 7.0, 7.5, 8.0    Protein, Urine 20 (TRACE) NEGATIVE, 10 (TRACE), 20 (TRACE) mg/dL    Glucose, Urine Normal Normal mg/dL    Blood, Urine NEGATIVE NEGATIVE    Ketones, Urine TRACE (A) NEGATIVE mg/dL    Bilirubin, Urine NEGATIVE NEGATIVE    Urobilinogen, Urine 2 (1+) (A) Normal mg/dL    Nitrite, Urine NEGATIVE NEGATIVE    Leukocyte Esterase, Urine NEGATIVE NEGATIVE   Urinalysis Microscopic   Result Value Ref Range    WBC, Urine 1-5 1-5, NONE /HPF    RBC, Urine 3-5 NONE, 1-2, 3-5 /HPF    Squamous Epithelial Cells, Urine 1-9 (SPARSE) Reference range not established. /HPF    Mucus, Urine FEW Reference range not established. /LPF   Drug Screen, Urine   Result Value Ref Range    Amphetamine Screen, Urine Presumptive Negative Presumptive Negative    Barbiturate Screen, Urine Presumptive Negative Presumptive Negative    Benzodiazepines Screen, Urine Presumptive Negative Presumptive Negative    Cannabinoid Screen, Urine Presumptive Negative Presumptive Negative    Cocaine Metabolite Screen, Urine Presumptive Negative Presumptive Negative    Fentanyl Screen, Urine Presumptive Negative Presumptive Negative    Opiate Screen, Urine Presumptive Positive (A) Presumptive Negative    Oxycodone Screen, Urine Presumptive Negative Presumptive Negative    PCP Screen, Urine Presumptive Negative Presumptive Negative    Methadone Screen, Urine Presumptive Negative Presumptive Negative   Basic metabolic panel   Result Value Ref Range    Glucose 71 (L) 74 - 99 mg/dL    Sodium 138 136 - 145 mmol/L    Potassium 4.8 3.5 - 5.3 mmol/L    Chloride 108 (H) 98 - 107 mmol/L    Bicarbonate 22 21 - 32 mmol/L    Anion Gap 13 10 - 20 mmol/L    Urea Nitrogen 13 6 - 23 mg/dL    Creatinine 1.04 0.50 - 1.05 mg/dL     eGFR 53 (L) >60 mL/min/1.73m*2    Calcium 8.3 (L) 8.6 - 10.3 mg/dL   Ammonia   Result Value Ref Range    Ammonia 39 16 - 53 umol/L   Procalcitonin   Result Value Ref Range    Procalcitonin 0.04 <=0.07 ng/mL   Lavender Top   Result Value Ref Range    Extra Tube Hold for add-ons.    POCT GLUCOSE   Result Value Ref Range    POCT Glucose 112 (H) 74 - 99 mg/dL        Imaging:  XR abdomen 1 view  Narrative: Interpreted By:  Eneida Brownlee,   STUDY:  XR ABDOMEN 1 VIEW;  8/21/2024 10:24 pm      INDICATION:  Signs/Symptoms:constipation.      COMPARISON:  None.      ACCESSION NUMBER(S):  [id number]      ORDERING CLINICIAN:  PILAR STEWART      FINDINGS:  There is a nonspecific nonobstructive bowel gas pattern. Contrast is  noted in the urinary bladder. Degenerative changes are seen in the  lumbar spine.      Impression: 1.  Nonspecific nonobstructive bowel gas pattern.      MACRO:  None      Signed by: Eneida Brownlee 8/21/2024 11:31 PM  Dictation workstation:   [id number]

## 2024-08-22 NOTE — PROGRESS NOTES
"When pt came up on the unit she was very agitated and combative. She would not respond to commands and when asked the orientation questions she said \"that it was none of my business\". When I tried to assess her, her response was \"kiss my ass. Pt was pulling at her IV and would allow me to give her any medication. Pt was placed in bilateral soft wrist restraints. At 2351 Pt was more alert and cooperative and restraints were removed at 0530. Will try to notify family of pt being in restraints . Pt didn't have any family at bedside when she arrived to complete her admission questions. She refused to answer any of my questions. Pt was hypertensive  but controlled with IV hydralazine. Pt was straight cathed in the AM for feeling like she had to go and couldn't. Pt had 250mls out and a UA was sent down to lab.  Pt was more cooperative and oriented in the AM.  Will continue plan of care.  "

## 2024-08-22 NOTE — PROGRESS NOTES
Physical Therapy    Physical Therapy Evaluation    Patient Name: Fabiola Vale  MRN: [id number]  Today's Date: 8/22/2024   Time Calculation  Start Time: 0849  Stop Time: 0907  Time Calculation (min): 18 min  1009/1009-A    Assessment/Plan   PT Assessment  PT Assessment Results: Decreased strength, Decreased endurance, Impaired balance, Decreased mobility, Impaired judgement, Decreased cognition, Decreased safety awareness  Rehab Prognosis: Fair  Barriers to Participation: Comorbidities, Premorbid level of function, Insight into problems  End of Session Communication: Bedside nurse  Assessment Comment: Pt presents with decreased functional mobility secondary to weakness, decreased safety awareness, poor insight into deficits, and decreased activity tolerance. Pt requiring mod A for mobility this date and increased time to complete activities. Pt will benefit from PT to address above deficits during hospital stay and then moderate intensity PT upon discharge.  End of Session Patient Position: Up in chair, Alarm on  IP OR SWING BED PT PLAN  Inpatient or Swing Bed: Inpatient (Call light in reach, breakfast tray setup, all needs met)  PT Plan  Treatment/Interventions: Bed mobility, Transfer training, Gait training, Stair training, Balance training, Neuromuscular re-education, Strengthening, Endurance training, Range of motion, Therapeutic exercise, Therapeutic activity, Home exercise program  PT Plan: Ongoing PT  PT Frequency: 3 times per week  PT Discharge Recommendations: Moderate intensity level of continued care, 24 hr supervision due to cognition  PT Recommended Transfer Status: Assist x1, Assistive device  PT - OK to Discharge: Yes (PT eval complete, ok to d/c once deemed medically appropriate.)    Subjective     Current Problem:  1. Acute encephalopathy        2. Acute cystitis without hematuria        3. Altered mental status, unspecified altered mental status type          Patient Active Problem List    Diagnosis    Acute hypercapnic respiratory failure (Multi)    Allergic rhinitis    Asbestos exposure    Asthma (HHS-HCC)    Chronic obstructive pulmonary disease with acute exacerbation (Multi)    Atherosclerosis of coronary artery    B12 deficiency    Cervical radiculopathy    Chronic kidney disease, stage 3a (Multi)    Chronic kidney disease, stage 3b (Multi)    Chronic venous insufficiency    Congestive heart failure (Multi)    GERD (gastroesophageal reflux disease)    Hyperglycemia    Hyperlipidemia    Hypertension    Hypothyroidism    Mild intermittent asthma without complication (HHS-HCC)    Obesity due to excess calories with serious comorbidity    Neuropathy    Osteoarthritis    Osteopenia    Vitamin D deficiency    Acute encephalopathy       General Visit Information:  General  Reason for Referral: impaired mobility  Referred By: Rodney PT/OT eval and treat  Past Medical History Relevant to Rehab: COPD, HTN, CKD, hypothyroidism, prior smoker, possible underlying dementia  Family/Caregiver Present: No  Co-Treatment: OT  Co-Treatment Reason: Maximize pt safety  Prior to Session Communication: Bedside nurse  Patient Position Received: Bed, 3 rail up, Alarm on (purewick external catheter)  General Comment: Pt to ED 8/20 with complaints of low back pain and constipation. Diganosed with UTI and discharged home. Returned to ED 8/21 with AMS and agitation, noncompliance with taking antibiotics at home. Concern for acute encaphalopathy.    Home Living:  Home Living  Home Living Comments: Pt mary historian. Confirms she lives with her daughter, stating it is a single story home.    Prior Level of Function:  Prior Function Per Pt/Caregiver Report  Prior Function Comments: Pt mary rincon.    Precautions:  Precautions  Medical Precautions: Fall precautions  Precautions Comment: Per RN, pt in soft wrist restraints overnight, however discontinued this morning.    Vital Signs:     Objective     Pain:  Pain  "Assessment  Pain Assessment: 0-10  0-10 (Numeric) Pain Score: 0 - No pain    Cognition:  Cognition  Overall Cognitive Status: Impaired (possible underlying dementia. Easily agitated.)  Orientation Level: Disoriented to situation, Disoriented to time (able to state \"hospital\".)  Following Commands:  (Follows one step commands 25-50% of the time.)  Processing Speed: Delayed (Poor initiation.)    General Assessments:            Strength  Strength Comments: Poor command following. Unable to formally assess LE MMT, however functionally at least 3+/5.        Postural Control  Posture Comment: Forward head/rounded shoulders.  Static Sitting Balance  Static Sitting-Comment/Number of Minutes: Fair+  Dynamic Sitting Balance  Dynamic Sitting-Comments: Fair  Static Standing Balance  Static Standing-Comment/Number of Minutes: Fair  Dynamic Standing Balance  Dynamic Standing-Comments: Fair    Functional Assessments:     Bed Mobility  Bed Mobility: Yes  Bed Mobility 1  Bed Mobility 1: Supine to sitting  Level of Assistance 1: Moderate assistance  Bed Mobility Comments 1: Mod A for trunk control and bringing BLE's over EOB. Cues for hand placement.  Transfers  Transfer: Yes  Transfer 1  Technique 1: Sit to stand, Stand to sit  Transfer Device 1:  (FWW)  Transfer Level of Assistance 1: Moderate assistance  Trials/Comments 1: Cues for hand placement however pt repeatedly reaching to pull up from FWW. Assist for lift and balance upon stand. Increased time to complete. Requires therapist assist to scoot back in chair.  Ambulation/Gait Training  Ambulation/Gait Training Performed: Yes  Ambulation/Gait Training 1  Surface 1: Level tile  Device 1: Rolling walker  Assistance 1: Moderate assistance  Quality of Gait 1: Decreased step length, Inconsistent stride length  Comments/Distance (ft) 1: Pt takes about 3 steps bed > recliner with FWW and mod A. Unable to manage FWW requiring PT assist. Increased time to complete.      "     Extremity/Trunk Assessments:        RLE   RLE :  (ROM WFL)  LLE   LLE :  (ROM WFL)    Outcome Measures:     [facility] Basic Mobility  Turning from your back to your side while in a flat bed without using bedrails: A lot  Moving from lying on your back to sitting on the side of a flat bed without using bedrails: A lot  Moving to and from bed to chair (including a wheelchair): A lot  Standing up from a chair using your arms (e.g. wheelchair or bedside chair): A lot  To walk in hospital room: A lot  Climbing 3-5 steps with railing: A lot  Basic Mobility - Total Score: 12                                                             Goals:  Encounter Problems       Encounter Problems (Active)       PT Problem       Pt will demonstrate sup > sit and sit > sup bed mobility mod I  (Progressing)       Start:  08/22/24    Expected End:  09/05/24            Pt will demo sit > stand and stand > sit transfer with FWW and SBA  (Progressing)       Start:  08/22/24    Expected End:  09/05/24            Pt will ambulate 25' with SBA and FWW, without LOB  (Progressing)       Start:  08/22/24    Expected End:  09/05/24            Pt will demonstrate ability to tolerate 8 minutes of seated or standing therapeutic exercise to demonstrate improved activity tolerance.  (Progressing)       Start:  08/22/24    Expected End:  09/05/24               Pain - Adult            Education Documentation  Mobility Training, taught by Laure Kat, PT at 8/22/2024 11:54 AM.  Learner: Patient  Readiness: Acceptance  Method: Explanation  Response: No Evidence of Learning  Comment: Educated pt on PT POC

## 2024-08-22 NOTE — PROGRESS NOTES
Occupational Therapy    Evaluation    Patient Name: Fabiola Vale  MRN: [id number]  Today's Date: 8/22/2024  Time Calculation  Start Time: 0849  Stop Time: 0906  Time Calculation (min): 17 min    Assessment  IP OT Assessment  End of Session Communication: Bedside nurse  End of Session Patient Position: Up in chair, Alarm on (all needs in reach)  Plan:  Treatment Interventions: ADL retraining, Functional transfer training, UE strengthening/ROM, Endurance training, Cognitive reorientation, Patient/family training, Equipment evaluation/education, Compensatory technique education  OT Frequency: 3 times per week  OT Discharge Recommendations: Moderate intensity level of continued care, 24 hr supervision due to cognition  OT - OK to Discharge: Yes (when medically appropriate)    General:  General  Reason for Referral: ADL  Referred By: Rodney  Past Medical History Relevant to Rehab: Includes: COPD, HTN, CKD, hypothyroidism, obesity, prior smoker, possible underlying dementia  Family/Caregiver Present: No  Co-Treatment: PT  Co-Treatment Reason: safety  Prior to Session Communication: Bedside nurse  Patient Position Received: Bed, 3 rail up, Alarm on  General Comment: Pt to ED 8/20 with complaints of low back pain and constipation. Diagnosed with UTI and discharged home. Returned to ED 8/21 with AMS and agitation, noncompliance with taking antibiotics at home. Concern for acute encaphalopathy.  Precautions:  Medical Precautions: Fall precautions  Precautions Comment: Per RN, pt in soft wrist restraints overnight (combative), however discontinued this morning.  Vital Signs:     Pain:  Pain Assessment  Pain Assessment: 0-10  0-10 (Numeric) Pain Score: 0 - No pain    Objective   Cognition:  Overall Cognitive Status: Impaired (possible underlying dementia. Easily agitated.)  Orientation Level: Disoriented to situation, Disoriented to time  Following Commands:  (25 to 50% 1 step command following)  Insight: Moderate  Task  Initiation: Initiates with cues (recuires verbal cues, tactile cues, demonstration and hand over hand at times)  Processing Speed: Delayed           Home Living:  Home Living Comments: Pt mary historian. Confirms she lives with her daughter, stating it is a single story home.   Prior Function:  Prior Function Comments: Pt mary historian. States that she ambulates with a FWW.  IADL History:     ADL:  Eating Deficit: Setup, Supervision/safety, Verbal cueing  Grooming Assistance: Moderate  Bathing Assistance: Maximal  UE Dressing Assistance: Moderate  LE Dressing Assistance: Maximal  Toileting Assistance with Device: Moderate  Activity Tolerance:     Bed Mobility/Transfers: Bed Mobility  Bed Mobility: Yes  Bed Mobility 1  Bed Mobility Comments 1: sup to sit, mod assist with cues and assistance for technique and initiation as well as assistance to move LEs over EOB and elevate trunk.    Transfers  Transfer:  (Sit to stand at EOB and stand to sit at recliner, mod assist.  Pt. could not initiate scooting backwards in chair after extensive cueing.  Dependent to scoot to back of chair.)      Functional Mobility:  Functional Mobility  Functional Mobility Performed:  (bed to chair ~ 5 steps with FWW, mod assist)  Sitting Balance:  Static Sitting Balance  Static Sitting-Comment/Number of Minutes: Good  Dynamic Sitting Balance  Dynamic Sitting-Comments: Good (-)  Standing Balance:  Static Standing Balance  Static Standing-Comment/Number of Minutes: Fair (-)  Dynamic Standing Balance  Dynamic Standing-Comments: Poor (+)    Strength:  Strength Comments: Unable to formally assess due to poor command following and easily agitated patient       Extremities: RUE   RUE :  (RUE AROM WFL) and LUE   LUE:  (L shoulder ~ 80 degrees active flexion and pt. demonstrates pain responses with L shoulder movement.  Distal LUE AROM WFL.)    Outcome Measures: [facility] Daily Activity  Putting on and taking off regular lower body clothing: A  lot  Bathing (including washing, rinsing, drying): A lot  Putting on and taking off regular upper body clothing: A lot  Toileting, which includes using toilet, bedpan or urinal: A lot  Taking care of personal grooming such as brushing teeth: A lot  Eating Meals: A little  Daily Activity - Total Score: 13      Education Documentation  Body Mechanics, taught by Cecile Godinez OT at 8/22/2024 12:30 PM.  Learner: Patient  Readiness: Acceptance  Method: Explanation  Response: Verbalizes Understanding, Needs Reinforcement    Precautions, taught by Cecile Godinez OT at 8/22/2024 12:30 PM.  Learner: Patient  Readiness: Acceptance  Method: Explanation  Response: Verbalizes Understanding, Needs Reinforcement    ADL Training, taught by Cecile Godinez OT at 8/22/2024 12:30 PM.  Learner: Patient  Readiness: Acceptance  Method: Explanation  Response: Verbalizes Understanding, Needs Reinforcement    Education Comments  No comments found.      Goals:   Encounter Problems       Encounter Problems (Active)       OT Goals       CGA bed mobility.        Start:  08/22/24    Expected End:  09/05/24            CGA sit/stand, bed/chair/commode with FWW.        Start:  08/22/24    Expected End:  09/05/24            CGA ADL functional mobility.        Start:  08/22/24    Expected End:  09/05/24            Fair (+) dynamic standing balance for ADL with UE support as needed.        Start:  08/22/24    Expected End:  09/05/24            Initiate tasks after first command with 100% accuracy.        Start:  08/22/24    Expected End:  09/05/24

## 2024-08-22 NOTE — PROGRESS NOTES
Spoke with daughter. Went over home med rec. Pts daughter wanted to speak with the dr about possibly changing around her BP meds due to slight swelling in her feet

## 2024-08-23 LAB
ATRIAL RATE: 59 BPM
P AXIS: 57 DEGREES
P OFFSET: 203 MS
P ONSET: 141 MS
PR INTERVAL: 162 MS
Q ONSET: 222 MS
QRS COUNT: 9 BEATS
QRS DURATION: 76 MS
QT INTERVAL: 354 MS
QTC CALCULATION(BAZETT): 350 MS
QTC FREDERICIA: 352 MS
R AXIS: -16 DEGREES
T AXIS: 73 DEGREES
T OFFSET: 399 MS
VENTRICULAR RATE: 59 BPM

## 2024-08-23 PROCEDURE — 97535 SELF CARE MNGMENT TRAINING: CPT | Mod: GO

## 2024-08-23 PROCEDURE — 2500000002 HC RX 250 W HCPCS SELF ADMINISTERED DRUGS (ALT 637 FOR MEDICARE OP, ALT 636 FOR OP/ED): Performed by: INTERNAL MEDICINE

## 2024-08-23 PROCEDURE — 97530 THERAPEUTIC ACTIVITIES: CPT | Mod: GP

## 2024-08-23 PROCEDURE — 2500000004 HC RX 250 GENERAL PHARMACY W/ HCPCS (ALT 636 FOR OP/ED): Performed by: INTERNAL MEDICINE

## 2024-08-23 PROCEDURE — 1210000001 HC SEMI-PRIVATE ROOM DAILY

## 2024-08-23 PROCEDURE — 2500000001 HC RX 250 WO HCPCS SELF ADMINISTERED DRUGS (ALT 637 FOR MEDICARE OP): Performed by: INTERNAL MEDICINE

## 2024-08-23 PROCEDURE — 99232 SBSQ HOSP IP/OBS MODERATE 35: CPT | Performed by: INTERNAL MEDICINE

## 2024-08-23 ASSESSMENT — COGNITIVE AND FUNCTIONAL STATUS - GENERAL
DRESSING REGULAR LOWER BODY CLOTHING: A LOT
EATING MEALS: A LITTLE
HELP NEEDED FOR BATHING: A LOT
WALKING IN HOSPITAL ROOM: A LOT
MOVING TO AND FROM BED TO CHAIR: A LOT
MOBILITY SCORE: 14
CLIMB 3 TO 5 STEPS WITH RAILING: A LOT
MOVING FROM LYING ON BACK TO SITTING ON SIDE OF FLAT BED WITH BEDRAILS: A LITTLE
TURNING FROM BACK TO SIDE WHILE IN FLAT BAD: A LITTLE
DAILY ACTIVITIY SCORE: 15
PERSONAL GROOMING: A LITTLE
STANDING UP FROM CHAIR USING ARMS: A LOT
DRESSING REGULAR UPPER BODY CLOTHING: A LITTLE
TOILETING: A LOT

## 2024-08-23 ASSESSMENT — PAIN SCALES - GENERAL
PAINLEVEL_OUTOF10: 0 - NO PAIN
PAINLEVEL_OUTOF10: 0 - NO PAIN

## 2024-08-23 ASSESSMENT — PAIN - FUNCTIONAL ASSESSMENT
PAIN_FUNCTIONAL_ASSESSMENT: 0-10
PAIN_FUNCTIONAL_ASSESSMENT: 0-10

## 2024-08-23 ASSESSMENT — ACTIVITIES OF DAILY LIVING (ADL): HOME_MANAGEMENT_TIME_ENTRY: 13

## 2024-08-23 NOTE — PROGRESS NOTES
Physical Therapy    Physical Therapy Treatment    Patient Name: Fabiola Vale  MRN: [id number]  Today's Date: 8/23/2024  Time Calculation  Start Time: 1318  Stop Time: 1344  Time Calculation (min): 26 min     1009/1009-A    Assessment/Plan   PT Assessment  PT Assessment Results: Decreased strength, Decreased endurance, Impaired balance, Decreased mobility, Impaired judgement, Decreased cognition, Decreased safety awareness  Rehab Prognosis: Good  Barriers to Participation: Comorbidities, Premorbid level of function, Insight into problems  End of Session Communication: Bedside nurse  Assessment Comment: Pt with improved initiation for therapuetic activities this date. She requires mod A for transfers and gait with FWW and has difficulty with ww management. Pt requires increased time to complete activities. She will continue to benefit from therapy to further progress towards PLOF.  End of Session Patient Position: Up in chair, Alarm on (all needs met, call light in reach, daughter present)     PT Plan  Treatment/Interventions: Bed mobility, Transfer training, Gait training, Stair training, Balance training, Neuromuscular re-education, Strengthening, Endurance training, Range of motion, Therapeutic exercise, Therapeutic activity, Home exercise program  PT Plan: Ongoing PT  PT Frequency: 3 times per week  PT Discharge Recommendations: Moderate intensity level of continued care, 24 hr supervision due to cognition  PT Recommended Transfer Status: Assist x1, Assistive device  PT - OK to Discharge: Yes (PT eval complete, ok to d/c once deemed medically appropriate.)    Current Problem:  Patient Active Problem List   Diagnosis    Acute hypercapnic respiratory failure (Multi)    Allergic rhinitis    Asbestos exposure    Asthma ([facility]-H[facility])    Chronic obstructive pulmonary disease with acute exacerbation (Multi)    Atherosclerosis of coronary artery    B12 deficiency    Cervical radiculopathy    Chronic kidney disease, stage 3a  (Multi)    Chronic kidney disease, stage 3b (Multi)    Chronic venous insufficiency    Congestive heart failure (Multi)    GERD (gastroesophageal reflux disease)    Hyperglycemia    Hyperlipidemia    Hypertension    Hypothyroidism    Mild intermittent asthma without complication (HHS-HCC)    Obesity due to excess calories with serious comorbidity    Neuropathy    Osteoarthritis    Osteopenia    Vitamin D deficiency    Acute encephalopathy       General Visit Information:   PT  Visit  PT Received On: 08/23/24  General  Reason for Referral: impaired mobility  Referred By: Rodney  Past Medical History Relevant to Rehab: Includes: COPD, HTN, CKD, hypothyroidism, obesity, prior smoker, possible underlying dementia  Family/Caregiver Present: Yes (daughter present at beginning and end of session)  Co-Treatment: OT  Co-Treatment Reason: Maximize pt safety  Prior to Session Communication: Bedside nurse  Patient Position Received: Bed, 3 rail up, Alarm on  General Comment: Pt supine in bed, agreeable to PT treatment.  Subjective     Precautions:  Precautions  Medical Precautions: Fall precautions  Precautions Comment: purewick external catheter (discontinued)    Vital Signs:     Objective     Pain:  Pain Assessment  Pain Assessment: 0-10  0-10 (Numeric) Pain Score: 0 - No pain    Cognition:  Cognition  Overall Cognitive Status: Impaired at baseline (possible underlying dementia)  Orientation Level: Disoriented to situation, Disoriented to time  Following Commands:  (Follows 1 step commands ~75% of the time)  Task Initiation: Initiates with cues (improved vs initial eval)  Processing Speed: Delayed    Postural Control:  Postural Control  Posture Comment: Forward head/rounded shoulders.    Extremity/Trunk Assessments:        RLE   RLE :  (ROM WFL)  LLE   LLE :  (ROM WFL)    Activity Tolerance:  Activity Tolerance  Endurance: Tolerates 10 - 20 min exercise with multiple rests    Treatments:  Therapeutic Exercise  Therapeutic  Exercise Performed: No        Bed Mobility  Bed Mobility: Yes  Bed Mobility 1  Bed Mobility 1: Supine to sitting  Level of Assistance 1: Minimum assistance  Bed Mobility Comments 1: Increased time to complete. Cues to scoot to EOB  Ambulation/Gait Training  Ambulation/Gait Training Performed: Yes  Ambulation/Gait Training 1  Surface 1: Level tile  Device 1: Rolling walker  Gait Support Devices: Gait belt  Assistance 1: Moderate assistance  Quality of Gait 1: Decreased step length, Inconsistent stride length  Comments/Distance (ft) 1: Pt ambulates ~4' bed > BSC > recliner chair with FWW. Mod A for ww management, problem solving, and safety.  Transfers  Transfer: Yes  Transfer 1  Transfer From 1: Bed to  Transfer to 1: Commode-standard  Technique 1: Sit to stand, Stand to sit  Transfer Device 1: Gait belt (FWW)  Transfer Level of Assistance 1: Moderate assistance  Trials/Comments 1: Cues for hand placement with FWW, assist for lift and balance upon stand  Transfers 2  Transfer From 2: Commode-standard to  Transfer to 2: Chair with arms  Technique 2: Sit to stand, Stand to sit  Transfer Device 2: Gait belt (FWW)  Transfer Level of Assistance 2: Moderate assistance  Trials/Comments 2: Assist for lift and balance upon stand. Cues to scoot back in chair.          Outcome Measures:     [facility] Basic Mobility  Turning from your back to your side while in a flat bed without using bedrails: A little  Moving from lying on your back to sitting on the side of a flat bed without using bedrails: A little  Moving to and from bed to chair (including a wheelchair): A lot  Standing up from a chair using your arms (e.g. wheelchair or bedside chair): A lot  To walk in hospital room: A lot  Climbing 3-5 steps with railing: A lot  Basic Mobility - Total Score: 14                                      Education Documentation  Mobility Training, taught by Laure Kat PT at 8/23/2024  1:57 PM.  Learner: Patient  Readiness:  Acceptance  Method: Explanation  Response: Needs Reinforcement  Comment: Educated pt on safe FWW management           EDUCATION:     Encounter Problems       Encounter Problems (Active)       PT Problem       Pt will demonstrate sup > sit and sit > sup bed mobility mod I  (Progressing)       Start:  08/22/24    Expected End:  09/05/24            Pt will demo sit > stand and stand > sit transfer with FWW and SBA  (Progressing)       Start:  08/22/24    Expected End:  09/05/24            Pt will ambulate 25' with SBA and FWW, without LOB  (Progressing)       Start:  08/22/24    Expected End:  09/05/24            Pt will demonstrate ability to tolerate 8 minutes of seated or standing therapeutic exercise to demonstrate improved activity tolerance.  (Progressing)       Start:  08/22/24    Expected End:  09/05/24               Pain - Adult

## 2024-08-23 NOTE — PROGRESS NOTES
ASSESSMENT & PLAN:     Acute encephalopathy vs ?dementia with behavior disturbance - UA neg for UTI, recent cx neg as well. CT head noncon from 8/20 neg, rpt this admission unchanged. Labs otherwise largely unremarkable. UDS unremarkable. KUB did not show sig constipation or fecal impaction. Unclear etiology at this time. May just be underlying undiagnosed dementia with behavioral disturbance. She is improved now, seems to be close to her baseline. PT/OT eval done, will need SNF. Daughter agreeable. Started low dose seroquel at bedtime while here, will stop on discharge.     HTN - cont amlodipine 5, titrate PRN  CKD3 - at baseline  Hypothyroidism - TSH wnl here. Cont home synthroid  COPD - not in acute exacerbation, not taking home controller inhalers. PRN duonebs.      Vte ppx sqh  Dispo snf    Lyle Stevens MD    SUBJECTIVE     NAEON. No complaints.     OBJECTIVE:       Last Recorded Vitals:  Vitals:    08/22/24 1425 08/22/24 1911 08/22/24 2337 08/23/24 0812   BP: (!) 189/77 126/61 163/69 145/63   BP Location:  Left arm Left arm    Patient Position:  Lying Lying    Pulse: 66 59 61 65   Resp:  19 18    Temp: 36.5 °C (97.7 °F) 36 °C (96.8 °F) 35.9 °C (96.6 °F) 36.4 °C (97.5 °F)   TempSrc:  Temporal Temporal    SpO2: 91% 90% 92% 93%   Weight:       Height:           Last I/O:  I/O last 3 completed shifts:  In: 800 (7.9 mL/kg) [P.O.:800]  Out: 1 (0 mL/kg) [Urine:1 (0 mL/kg/hr)]  Weight: 101.3 kg     Physical Exam:  GEN: appears stated age, NAD  CV: RRR, no m/r/g, trace LE edema  LUNGS: CTAB, no w/r/c  ABD: soft, NT, ND, NBS  SKIN: no rashes  MSK; no gross deformities, normal joints  NEURO: A+Ox2, no FND  PSYCH: appropriate mood, affec    Inpatient Medications:  amLODIPine, 5 mg, oral, Daily  aspirin, 81 mg, oral, Daily  cetirizine, 10 mg, oral, Daily  heparin (porcine), 7,500 Units, subcutaneous, q8h SIOMARA  levothyroxine, 25 mcg, oral, Daily  QUEtiapine, 25 mg, oral, Nightly  sennosides, 1 tablet, oral,  Daily        PRN Medications  PRN medications: acetaminophen, hydrALAZINE, ipratropium-albuteroL, ondansetron, polyethylene glycol    Continuous Medications:         LABS AND IMAGING:     Labs:  Results for orders placed or performed during the hospital encounter of 08/21/24 (from the past 24 hour(s))   POCT GLUCOSE   Result Value Ref Range    POCT Glucose 112 (H) 74 - 99 mg/dL        Imaging:  CT head wo IV contrast  Narrative: Interpreted By:  Lillie Lind,   STUDY:  CT HEAD WO IV CONTRAST;  8/22/2024 11:29 am      INDICATION:  Signs/Symptoms:encephalopathy, change from 8/20 when last CT head was  done.      COMPARISON:  CT head 08/20/2024. No evidence of a large territorial infarct      ACCESSION NUMBER(S):  [id number]      ORDERING CLINICIAN:  PILAR STEWART      TECHNIQUE:  Noncontrast axial CT scan of head was performed. Multiplanar  reconstructions      FINDINGS:  No evidence of a large territorial infarct. Gray-white matter  interfaces are preserved. No acute intracranial hemorrhage, mass  effect, midline shift, or herniation. No evidence of hydrocephalus.      The ventricles and sulci are prominent for age. There is stable  patchy periventricular and deep white matter hypodensities,  consistent with chronic microangiopathic changes.      The visualized paranasal sinuses and mastoid air cells are clear. No  acute osseous abnormality of the calvarium. No destructive bone  lesion.      Impression: 1. No evidence of acute intracranial abnormality or significant  interval change.  2. Stable cerebral volume loss and chronic microangiopathic changes  of the periventricular and deep white matter.          Signed by: Lillie Lind 8/22/2024 12:24 PM  Dictation workstation:   [id number]

## 2024-08-23 NOTE — PROGRESS NOTES
08/23/24 1325   Discharge Planning   Home or Post Acute Services Post acute facilities (Rehab/SNF/etc)   Type of Post Acute Facility Services Skilled nursing   Expected Discharge Disposition SNF   Does the patient need discharge transport arranged? Yes   RoundTrip coordination needed? Yes   Patient Choice   Provider Choice list and CMS website (https://medicare.gov/care-compare#search) for post-acute Quality and Resource Measure Data were provided and reviewed with: Family;Patient   Patient / Family choosing to utilize agency / facility established prior to hospitalization No     Per Dr Stevens, pt's dgt had mentioned she would like the pt to dc to SNF once medically cleared. [facility] attempted to call pt's dgt, Momo, but there was no answer. Left  with return contact information.    UPDATE 1515:  TCC spoke with pt's dgt regarding SNF for dc. Choice list offered/provided from [facility]. FOC is [facility]. Referral sent in [facility], pending acceptance. Pt with Medicare insurance and will meet 3 inpt midnight for dc tomorrow, 8/24.

## 2024-08-23 NOTE — PROGRESS NOTES
Occupational Therapy    Occupational Therapy Treatment    Name: Fabiola Vale  MRN: [id number]  : 1940  Date: 24  Time Calculation  Start Time: 1318  Stop Time: 1344  Time Calculation (min): 26 min    Assessment:  End of Session Communication: Bedside nurse  End of Session Patient Position: Up in chair, Alarm on (LEs elevated in recliner, all needs in reach, daughter present)  Plan:  Treatment Interventions: ADL retraining, Functional transfer training, UE strengthening/ROM, Endurance training, Cognitive reorientation, Patient/family training, Equipment evaluation/education, Compensatory technique education  OT Frequency: 3 times per week  OT Discharge Recommendations: Moderate intensity level of continued care, 24 hr supervision due to cognition  OT - OK to Discharge: Yes (when medically appropriate)    Subjective   Previous Visit Info:  OT Last Visit  OT Received On: 24  General:  General  Co-Treatment: PT  Co-Treatment Reason: safety  Prior to Session Communication: Bedside nurse  Patient Position Received: Bed, 3 rail up, Alarm on  General Comment: Pt supine in bed, agreeable to therapy.  Daughter present.  Precautions:  Medical Precautions: Fall precautions  Precautions Comment: purewick external catheter (discontinued - pt. removed this when she sat on the BSC)  Vitals:     Pain Assessment:  Pain Assessment  Pain Assessment: 0-10  0-10 (Numeric) Pain Score: 0 - No pain     Objective   Cognition:  Overall Cognitive Status: Impaired  Orientation Level: Disoriented to situation, Disoriented to time  Following Commands:  (Follows 1 step commands with ~ 75% accuracy.)  Insight: Moderate  Task Initiation:  (Initiation is significantly improved compared to yesterday.  Now initiating with verbal cues.)  Processing Speed: Delayed  Activities of Daily Living:      LE Dressing  LE Dressing:  (Seated EOB to don underwear over feet with mod assist to thread.  Mod assist to pull underwear up and mod  assist for dynamic standing balance while managing underwear over hips.)    Toileting  Toileting Comments: Cues and mod assist for hygiene 2nd to confusion and bowel movement.  Mod assist for dynamic standing at FWW during hygiene and underwear managment.  Mod assist with underwear managment. Verbal cues for all.       Bed Mobility/Transfers: Bed Mobility  Bed Mobility:  (Min assist for balance while completing sup to sit.  Cues for technique and increased time to complete.)    Transfers  Transfer:  (Mod assist sit to stand at EOB, EOB to BSC, sit<>stand at BSC, BSC to recliner with FWW and gait belt. Pt. was able to scoot to back of recliner today with verbal cues.)    Functional Mobility:  Functional Mobility  Functional Mobility Performed:  (Mod assist with FWW and gait belt for bed to BSC (5'), BSC to recliner (5').)  Sitting Balance:  Static Sitting Balance  Static Sitting-Comment/Number of Minutes: Good  Dynamic Sitting Balance  Dynamic Sitting-Comments: Good (-)/Fair (+)  Standing Balance:  Static Standing Balance  Static Standing-Comment/Number of Minutes: Fair (-)  Dynamic Standing Balance  Dynamic Standing-Comments: Poor (+)    Outcome Measures:  [facility] Daily Activity  Putting on and taking off regular lower body clothing: A lot  Bathing (including washing, rinsing, drying): A lot  Putting on and taking off regular upper body clothing: A little  Toileting, which includes using toilet, bedpan or urinal: A lot  Taking care of personal grooming such as brushing teeth: A little  Eating Meals: A little  Daily Activity - Total Score: 15      Education Documentation  Body Mechanics, taught by Cecile Godinez OT at 8/23/2024  3:06 PM.  Learner: Patient  Readiness: Acceptance  Method: Explanation  Response: Verbalizes Understanding    Precautions, taught by Cecile Godinez OT at 8/23/2024  3:06 PM.  Learner: Patient  Readiness: Acceptance  Method: Explanation  Response: Verbalizes Understanding    ADL  Training, taught by Cecile Godinez, OT at 8/23/2024  3:06 PM.  Learner: Patient  Readiness: Acceptance  Method: Explanation  Response: Verbalizes Understanding    Education Comments  No comments found.      Goals:  Encounter Problems       Encounter Problems (Active)       OT Goals       CGA bed mobility.  (Progressing)       Start:  08/22/24    Expected End:  09/05/24            CGA sit/stand, bed/chair/commode with FWW.  (Progressing)       Start:  08/22/24    Expected End:  09/05/24            CGA ADL functional mobility.  (Progressing)       Start:  08/22/24    Expected End:  09/05/24            Fair (+) dynamic standing balance for ADL with UE support as needed.  (Progressing)       Start:  08/22/24    Expected End:  09/05/24            Initiate tasks after first command with 100% accuracy.  (Progressing)       Start:  08/22/24    Expected End:  09/05/24

## 2024-08-24 VITALS
DIASTOLIC BLOOD PRESSURE: 87 MMHG | BODY MASS INDEX: 43.85 KG/M2 | TEMPERATURE: 98.2 F | OXYGEN SATURATION: 90 % | HEART RATE: 60 BPM | WEIGHT: 223.33 LBS | HEIGHT: 60 IN | SYSTOLIC BLOOD PRESSURE: 194 MMHG | RESPIRATION RATE: 17 BRPM

## 2024-08-24 PROCEDURE — 2500000004 HC RX 250 GENERAL PHARMACY W/ HCPCS (ALT 636 FOR OP/ED): Performed by: INTERNAL MEDICINE

## 2024-08-24 PROCEDURE — 99239 HOSP IP/OBS DSCHRG MGMT >30: CPT | Performed by: INTERNAL MEDICINE

## 2024-08-24 PROCEDURE — 2500000001 HC RX 250 WO HCPCS SELF ADMINISTERED DRUGS (ALT 637 FOR MEDICARE OP): Performed by: INTERNAL MEDICINE

## 2024-08-24 RX ORDER — SENNOSIDES 8.6 MG/1
1 TABLET ORAL DAILY
Qty: 2 TABLET | Refills: 0 | Status: SHIPPED | OUTPATIENT
Start: 2024-08-24 | End: 2024-08-26

## 2024-08-24 RX ORDER — AMLODIPINE BESYLATE 5 MG/1
5 TABLET ORAL DAILY
Start: 2024-08-25

## 2024-08-24 ASSESSMENT — COGNITIVE AND FUNCTIONAL STATUS - GENERAL
PERSONAL GROOMING: A LITTLE
DAILY ACTIVITIY SCORE: 15
STANDING UP FROM CHAIR USING ARMS: A LITTLE
CLIMB 3 TO 5 STEPS WITH RAILING: A LOT
DRESSING REGULAR UPPER BODY CLOTHING: A LOT
STANDING UP FROM CHAIR USING ARMS: A LOT
WALKING IN HOSPITAL ROOM: A LOT
TOILETING: A LOT
MOVING TO AND FROM BED TO CHAIR: A LITTLE
PERSONAL GROOMING: A LITTLE
MOBILITY SCORE: 18
MOVING TO AND FROM BED TO CHAIR: A LOT
DRESSING REGULAR UPPER BODY CLOTHING: A LITTLE
MOBILITY SCORE: 16
TOILETING: A LITTLE
CLIMB 3 TO 5 STEPS WITH RAILING: A LOT
HELP NEEDED FOR BATHING: A LOT
WALKING IN HOSPITAL ROOM: A LOT
DAILY ACTIVITIY SCORE: 16
DRESSING REGULAR LOWER BODY CLOTHING: A LOT
HELP NEEDED FOR BATHING: A LOT
EATING MEALS: A LITTLE
DRESSING REGULAR LOWER BODY CLOTHING: A LOT

## 2024-08-24 ASSESSMENT — PAIN SCALES - GENERAL
PAINLEVEL_OUTOF10: 0 - NO PAIN
PAINLEVEL_OUTOF10: 0 - NO PAIN

## 2024-08-24 NOTE — PROGRESS NOTES
Discharge orders received. Pt accepted at [facility], notified facility. Spoke with pt & bedside nurse. Pt in agreement with transfer today. Let  for pt daughter     Momo Osorio (Daughter)  [phone]   Requested DSC for 7000 & transport.   11am Notified pt daughter of transport time, in agreement. Updated nursing staff & provided report number.

## 2024-08-24 NOTE — DISCHARGE SUMMARY
DISCHARGE DIAGNOSIS     Acute encephalopathy, resolved  HTN    HOSPITAL COURSE AND DETAILS     84F with PMH of COPD, CKD3, HTN, hypothyroidism, GERD who is presenting with confusion.     Acute encephalopathy vs ?dementia with behavior disturbance - UA neg for UTI, recent cx neg as well. CT head noncon from 8/20 neg, rpt this admission unchanged. Labs otherwise largely unremarkable. UDS unremarkable. KUB did not show sig constipation or fecal impaction. Unclear etiology at this time. May have just been underlying undiagnosed dementia with behavioral disturbance. She is improved now, seems to be close to her baseline. PT/OT eval done, will need SNF. Daughter agreeable. Fu with PCP.      HTN - cont amlodipine 5, fu with PCP  CKD3 - at baseline  Hypothyroidism - TSH wnl here. Cont home synthroid    Stable for dc to SNF. Total time spent on discharge services 31 minutes.     DISCHARGE PHYSICAL EXAM     Last Recorded Vitals:  Vitals:    08/23/24 1409 08/23/24 2024 08/24/24 0000 08/24/24 0824   BP: 132/60 169/76 154/71 (!) 194/87   BP Location:  Right arm Right arm    Patient Position:  Lying Lying    Pulse: 65 58 52 60   Resp:  16 17    Temp: 36.5 °C (97.7 °F) 36.6 °C (97.9 °F) 36.9 °C (98.4 °F) 36.8 °C (98.2 °F)   TempSrc:  Temporal Temporal    SpO2: 91% 93% 92% 90%   Weight:       Height:           Physical Exam:  GEN: appears stated age, NAD  CV: RRR, no m/r/g, trace LE edema  LUNGS: CTAB, no w/r/c  ABD: soft, NT, ND, NBS  SKIN: no rashes  MSK; no gross deformities, normal joints  NEURO: A+Ox2, no FND  PSYCH: appropriate mood, affec      PERTINENT LABS AND IMAGING     Results for orders placed or performed during the hospital encounter of 08/21/24 (from the past 96 hour(s))   TSH with reflex to Free T4 if abnormal   Result Value Ref Range    Thyroid Stimulating Hormone 2.91 0.44 - 3.98 mIU/L   CBC and Auto Differential   Result Value Ref Range    WBC 4.9 4.4 - 11.3 x10*3/uL    nRBC 0.0 0.0 - 0.0 /100 WBCs    RBC 4.36  4.00 - 5.20 x10*6/uL    Hemoglobin 13.5 12.0 - 16.0 g/dL    Hematocrit 42.3 36.0 - 46.0 %    MCV 97 80 - 100 fL    MCH 31.0 26.0 - 34.0 pg    MCHC 31.9 (L) 32.0 - 36.0 g/dL    RDW 13.6 11.5 - 14.5 %    Platelets 181 150 - 450 x10*3/uL    Neutrophils % 61.9 40.0 - 80.0 %    Immature Granulocytes %, Automated 0.2 0.0 - 0.9 %    Lymphocytes % 21.9 13.0 - 44.0 %    Monocytes % 11.3 2.0 - 10.0 %    Eosinophils % 4.3 0.0 - 6.0 %    Basophils % 0.4 0.0 - 2.0 %    Neutrophils Absolute 3.06 1.60 - 5.50 x10*3/uL    Immature Granulocytes Absolute, Automated 0.01 0.00 - 0.50 x10*3/uL    Lymphocytes Absolute 1.08 0.80 - 3.00 x10*3/uL    Monocytes Absolute 0.56 0.05 - 0.80 x10*3/uL    Eosinophils Absolute 0.21 0.00 - 0.40 x10*3/uL    Basophils Absolute 0.02 0.00 - 0.10 x10*3/uL   Lactate   Result Value Ref Range    Lactate 1.8 0.4 - 2.0 mmol/L   Comprehensive metabolic panel   Result Value Ref Range    Glucose 72 (L) 74 - 99 mg/dL    Sodium 139 136 - 145 mmol/L    Potassium 3.8 3.5 - 5.3 mmol/L    Chloride 108 (H) 98 - 107 mmol/L    Bicarbonate 18 (L) 21 - 32 mmol/L    Anion Gap 17 10 - 20 mmol/L    Urea Nitrogen 15 6 - 23 mg/dL    Creatinine 1.22 (H) 0.50 - 1.05 mg/dL    eGFR 44 (L) >60 mL/min/1.73m*2    Calcium 8.5 (L) 8.6 - 10.3 mg/dL    Albumin 3.5 3.4 - 5.0 g/dL    Alkaline Phosphatase 80 33 - 136 U/L    Total Protein 6.6 6.4 - 8.2 g/dL    AST 19 9 - 39 U/L    Bilirubin, Total 0.8 0.0 - 1.2 mg/dL    ALT 7 7 - 45 U/L   POCT GLUCOSE   Result Value Ref Range    POCT Glucose 94 74 - 99 mg/dL   Urinalysis with Reflex Culture and Microscopic   Result Value Ref Range    Color, Urine Yellow Light-Yellow, Yellow, Dark-Yellow    Appearance, Urine Clear Clear    Specific Gravity, Urine 1.035 1.005 - 1.035    pH, Urine 5.5 5.0, 5.5, 6.0, 6.5, 7.0, 7.5, 8.0    Protein, Urine 20 (TRACE) NEGATIVE, 10 (TRACE), 20 (TRACE) mg/dL    Glucose, Urine Normal Normal mg/dL    Blood, Urine NEGATIVE NEGATIVE    Ketones, Urine TRACE (A) NEGATIVE mg/dL     Bilirubin, Urine NEGATIVE NEGATIVE    Urobilinogen, Urine 2 (1+) (A) Normal mg/dL    Nitrite, Urine NEGATIVE NEGATIVE    Leukocyte Esterase, Urine NEGATIVE NEGATIVE   Extra Urine Gray Tube   Result Value Ref Range    Extra Tube Hold for add-ons.    Urinalysis Microscopic   Result Value Ref Range    WBC, Urine 1-5 1-5, NONE /HPF    RBC, Urine 3-5 NONE, 1-2, 3-5 /HPF    Squamous Epithelial Cells, Urine 1-9 (SPARSE) Reference range not established. /HPF    Mucus, Urine FEW Reference range not established. /LPF   Drug Screen, Urine   Result Value Ref Range    Amphetamine Screen, Urine Presumptive Negative Presumptive Negative    Barbiturate Screen, Urine Presumptive Negative Presumptive Negative    Benzodiazepines Screen, Urine Presumptive Negative Presumptive Negative    Cannabinoid Screen, Urine Presumptive Negative Presumptive Negative    Cocaine Metabolite Screen, Urine Presumptive Negative Presumptive Negative    Fentanyl Screen, Urine Presumptive Negative Presumptive Negative    Opiate Screen, Urine Presumptive Positive (A) Presumptive Negative    Oxycodone Screen, Urine Presumptive Negative Presumptive Negative    PCP Screen, Urine Presumptive Negative Presumptive Negative    Methadone Screen, Urine Presumptive Negative Presumptive Negative   Basic metabolic panel   Result Value Ref Range    Glucose 71 (L) 74 - 99 mg/dL    Sodium 138 136 - 145 mmol/L    Potassium 4.8 3.5 - 5.3 mmol/L    Chloride 108 (H) 98 - 107 mmol/L    Bicarbonate 22 21 - 32 mmol/L    Anion Gap 13 10 - 20 mmol/L    Urea Nitrogen 13 6 - 23 mg/dL    Creatinine 1.04 0.50 - 1.05 mg/dL    eGFR 53 (L) >60 mL/min/1.73m*2    Calcium 8.3 (L) 8.6 - 10.3 mg/dL   Ammonia   Result Value Ref Range    Ammonia 39 16 - 53 umol/L   Procalcitonin   Result Value Ref Range    Procalcitonin 0.04 <=0.07 ng/mL   Lavender Top   Result Value Ref Range    Extra Tube Hold for add-ons.    POCT GLUCOSE   Result Value Ref Range    POCT Glucose 112 (H) 74 - 99 mg/dL         CT head wo IV contrast   Final Result   1. No evidence of acute intracranial abnormality or significant   interval change.   2. Stable cerebral volume loss and chronic microangiopathic changes   of the periventricular and deep white matter.             Signed by: Lillie Lind 8/22/2024 12:24 PM   Dictation workstation:   [id number]      XR abdomen 1 view   Final Result   1.  Nonspecific nonobstructive bowel gas pattern.        MACRO:   None        Signed by: Eneida Brownlee 8/21/2024 11:31 PM   Dictation workstation:   [id number]          No echocardiogram results found for the past 14 days    DISCHARGE MEDICATIONS        Your medication list        START taking these medications        Instructions Last Dose Given Next Dose Due   amLODIPine 5 mg tablet  Commonly known as: Norvasc  Start taking on: August 25, 2024      Take 1 tablet (5 mg) by mouth once daily.              CONTINUE taking these medications        Instructions Last Dose Given Next Dose Due   aspirin 81 mg EC tablet           chlorhexidine 0.12 % solution  Commonly known as: Peridex      Use 15 mL in the mouth or throat once daily.       fexofenadine 180 mg tablet  Commonly known as: Allegra           levothyroxine 25 mcg tablet  Commonly known as: Synthroid, Levoxyl      TAKE 1 TABLET BY MOUTH DAILY       sennosides 8.6 mg tablet  Commonly known as: Senokot      Take 1 tablet (8.6 mg) by mouth once daily for 2 doses. Take 1 tab tonight, if no BM by tomorrow afternoon take second pill              STOP taking these medications      sulfamethoxazole-trimethoprim 800-160 mg tablet  Commonly known as: Bactrim DS                  Where to Get Your Medications        These medications were sent to [id number] - [facility], [place] - [address]  [address] [address]      Phone: [phone]   sennosides 8.6 mg tablet       Information about where to get these medications is not yet available    Ask your nurse  or doctor about these medications  amLODIPine 5 mg tablet         OUTPATIENT FOLLOW-UP     No future appointments.

## 2024-08-27 ENCOUNTER — NURSING HOME VISIT (OUTPATIENT)
Dept: POST ACUTE CARE | Facility: EXTERNAL LOCATION | Age: 84
End: 2024-08-27
Payer: MEDICARE

## 2024-08-27 DIAGNOSIS — G93.40 ACUTE ENCEPHALOPATHY: Primary | ICD-10-CM

## 2024-08-27 DIAGNOSIS — I10 HTN (HYPERTENSION), BENIGN: ICD-10-CM

## 2024-08-27 DIAGNOSIS — E03.9 ACQUIRED HYPOTHYROIDISM: ICD-10-CM

## 2024-08-27 DIAGNOSIS — J44.1 CHRONIC OBSTRUCTIVE PULMONARY DISEASE WITH ACUTE EXACERBATION (MULTI): ICD-10-CM

## 2024-08-27 DIAGNOSIS — N18.31 CHRONIC KIDNEY DISEASE, STAGE 3A (MULTI): ICD-10-CM

## 2024-08-27 PROBLEM — N18.32 CHRONIC KIDNEY DISEASE, STAGE 3B (MULTI): Status: RESOLVED | Noted: 2019-02-14 | Resolved: 2024-08-27

## 2024-08-27 PROBLEM — J45.909 ASTHMA (HHS-HCC): Status: RESOLVED | Noted: 2023-12-11 | Resolved: 2024-08-27

## 2024-08-27 PROCEDURE — 99305 1ST NF CARE MODERATE MDM 35: CPT | Performed by: FAMILY MEDICINE

## 2024-08-27 NOTE — PROGRESS NOTES
Admission H&P  Subjective   Fabiola Vale is a 84 y.o. female who is being seen for an admission H&P at [facility].  Nursing notes, vital signs, and labs were reviewed in the local facility chart and she  is being evaluated for multiple medical problems.   HPI   And review of the hospital record indicates that this 84-year-old female was admitted to the hospital on August 20 for encephalopathy versus dementia with behavior disturbance.  Imaging of the head, urinalysis, and blood work showed no acute pathology.  Confusion improved but the hospital team and family felt that she would benefit from close observation, physical therapy and Occupational Therapy and so she is admitted to the skilled nursing facility.  On questioning today she vaguely remembers the hospitalization but does appear to be oriented to person and place  Objective   There were no vitals taken for this visit.  Physical Exam  Constitutional:       Appearance: Normal appearance.   HENT:      Head: Normocephalic.   Eyes:      Conjunctiva/sclera: Conjunctivae normal.   Cardiovascular:      Rate and Rhythm: Normal rate and regular rhythm.      Heart sounds: Normal heart sounds.   Pulmonary:      Effort: Pulmonary effort is normal.      Breath sounds: Normal breath sounds.   Musculoskeletal:      Cervical back: Neck supple.   Skin:     General: Skin is warm and dry.   Neurological:      Mental Status: She is alert.       Assessment/Plan   Assessment & Plan  Acute encephalopathy  Patient this 84-year-old female was admitted to hospital for acute delirium.  Hospital workup including head imaging, urinalysis, extensive blood work, and x-ray imaging did not reveal any acute etiology.  There is a suspicion for underlying dementia.  She comes this facility for close observation, PT and OT.  At the end of that time.  We will reassess her mental status to determine if a chronic dementia diagnosis exists or not.  At the time of this dictation she continues  to appear confused and has difficulty remembering recent events       Chronic obstructive pulmonary disease with acute exacerbation (Multi)  Lung sounds are clear and she is in no respiratory distress       Chronic kidney disease, stage 3a (Multi)  Creatinine stabilized to normal and GFR is above 50 at the time of discharge from the hospital.       HTN (hypertension), benign  Well controlled with CCB and BP at 134/96 currently         Acquired hypothyroidism  Lab Results   Component Value Date    TSH 2.91 08/20/2024     This is well-controlled she will continue her current supplementation                Please excuse any errors in grammar or translation related to this dictation. Voice recognition software was utilized to prepare this document.

## 2024-08-27 NOTE — LETTER
Patient: Fabiola Vale  : 1940    Encounter Date: 2024    Admission H&P  Subjective  Fabiola Vale is a 84 y.o. female who is being seen for an admission H&P at [facility].  Nursing notes, vital signs, and labs were reviewed in the local facility chart and she  is being evaluated for multiple medical problems.   HPI   And review of the hospital record indicates that this 84-year-old female was admitted to the hospital on  for encephalopathy versus dementia with behavior disturbance.  Imaging of the head, urinalysis, and blood work showed no acute pathology.  Confusion improved but the hospital team and family felt that she would benefit from close observation, physical therapy and Occupational Therapy and so she is admitted to the skilled nursing facility.  On questioning today she vaguely remembers the hospitalization but does appear to be oriented to person and place  Objective  There were no vitals taken for this visit.  Physical Exam  Constitutional:       Appearance: Normal appearance.   HENT:      Head: Normocephalic.   Eyes:      Conjunctiva/sclera: Conjunctivae normal.   Cardiovascular:      Rate and Rhythm: Normal rate and regular rhythm.      Heart sounds: Normal heart sounds.   Pulmonary:      Effort: Pulmonary effort is normal.      Breath sounds: Normal breath sounds.   Musculoskeletal:      Cervical back: Neck supple.   Skin:     General: Skin is warm and dry.   Neurological:      Mental Status: She is alert.       Assessment/Plan  Assessment & Plan  Acute encephalopathy  Patient this 84-year-old female was admitted to hospital for acute delirium.  Hospital workup including head imaging, urinalysis, extensive blood work, and x-ray imaging did not reveal any acute etiology.  There is a suspicion for underlying dementia.  She comes this facility for close observation, PT and OT.  At the end of that time.  We will reassess her mental status to determine if a chronic dementia  diagnosis exists or not.  At the time of this dictation she continues to appear confused and has difficulty remembering recent events       Chronic obstructive pulmonary disease with acute exacerbation (Multi)  Lung sounds are clear and she is in no respiratory distress       Chronic kidney disease, stage 3a (Multi)  Creatinine stabilized to normal and GFR is above 50 at the time of discharge from the hospital.       HTN (hypertension), benign  Well controlled with CCB and BP at 134/96 currently         Acquired hypothyroidism  Lab Results   Component Value Date    TSH 2.91 08/20/2024     This is well-controlled she will continue her current supplementation                Please excuse any errors in grammar or translation related to this dictation. Voice recognition software was utilized to prepare this document.       Electronically Signed By: Evaristo Garcia MD   8/27/24 11:10 AM

## 2024-08-27 NOTE — ASSESSMENT & PLAN NOTE
Lab Results   Component Value Date    TSH 2.91 08/20/2024     This is well-controlled she will continue her current supplementation

## 2024-08-27 NOTE — ASSESSMENT & PLAN NOTE
Creatinine stabilized to normal and GFR is above 50 at the time of discharge from the hospital.

## 2024-08-27 NOTE — ASSESSMENT & PLAN NOTE
Patient this 84-year-old female was admitted to hospital for acute delirium.  Hospital workup including head imaging, urinalysis, extensive blood work, and x-ray imaging did not reveal any acute etiology.  There is a suspicion for underlying dementia.  She comes this facility for close observation, PT and OT.  At the end of that time.  We will reassess her mental status to determine if a chronic dementia diagnosis exists or not.  At the time of this dictation she continues to appear confused and has difficulty remembering recent events

## 2024-08-30 ENCOUNTER — NURSING HOME VISIT (OUTPATIENT)
Dept: POST ACUTE CARE | Facility: EXTERNAL LOCATION | Age: 84
End: 2024-08-30
Payer: MEDICARE

## 2024-08-30 DIAGNOSIS — E03.9 ACQUIRED HYPOTHYROIDISM: ICD-10-CM

## 2024-08-30 DIAGNOSIS — I10 HTN (HYPERTENSION), BENIGN: ICD-10-CM

## 2024-08-30 DIAGNOSIS — J44.1 CHRONIC OBSTRUCTIVE PULMONARY DISEASE WITH ACUTE EXACERBATION (MULTI): ICD-10-CM

## 2024-08-30 DIAGNOSIS — G93.40 ACUTE ENCEPHALOPATHY: Primary | ICD-10-CM

## 2024-08-30 DIAGNOSIS — R53.1 GENERALIZED WEAKNESS: ICD-10-CM

## 2024-08-30 DIAGNOSIS — N18.31 CHRONIC KIDNEY DISEASE, STAGE 3A (MULTI): ICD-10-CM

## 2024-08-30 PROCEDURE — 99308 SBSQ NF CARE LOW MDM 20: CPT

## 2024-08-30 NOTE — PROGRESS NOTES
Visit  Note   Subjective   Fabiola Vale is a 84 y.o. female who is being seen at [facility] and evaluated for multiple medical problems. Nursing notes, vital signs, and labs were reviewed in the local facility chart.  No chief complaint on file.     This is an 84 year old female evaluated today at [facility] following hospitalization for encephalopathy versus dementia with behavioral disturbance. On examination today, she is pleasant and cooperative. She denies any pain, shortness of breath or problems with bowel or bladder.        Objective   There were no vitals taken for this visit.  Physical Exam  Vitals reviewed.   Constitutional:       Appearance: Normal appearance.   HENT:      Head: Normocephalic.   Cardiovascular:      Rate and Rhythm: Normal rate and regular rhythm.   Pulmonary:      Breath sounds: Normal breath sounds.   Musculoskeletal:      Cervical back: Neck supple.   Skin:     General: Skin is warm and dry.   Neurological:      Mental Status: She is alert.       Assessment/Plan   Assessment & Plan  Acute encephalopathy  Admitted to hospital for acute delirium. Her work up was negative and suspected underlying dementia. She is here for close monitoring and rehabilitation with therapies. Plan is to monitor cognitive status. She has difficulty remembering recent events; when questioned, she does not remember being hospitalized.           Chronic kidney disease, stage 3a (Multi)  Lab Results   Component Value Date    GLUCOSE 71 (L) 08/22/2024    CALCIUM 8.3 (L) 08/22/2024     08/22/2024    K 4.8 08/22/2024    CO2 22 08/22/2024     (H) 08/22/2024    BUN 13 08/22/2024    CREATININE 1.04 08/22/2024     Stable. Avoid nephrotoxic agents continue to monitor periodically         Chronic obstructive pulmonary disease with acute exacerbation (Multi)  CTA on examination throughout all fields. She is not in respiratory distress.          HTN (hypertension), benign  Well controlled with CCB and  /74         Acquired hypothyroidism  Lab Results   Component Value Date    TSH 2.91 08/20/2024     This is well-controlled she will continue her current supplementation         Generalized weakness  Continue with physical and occupational therapies for rehabilitation in hopes of returning to baseline function.                 Please excuse any errors in grammar or translation related to this dictation. Voice recognition software was utilized to prepare this document.

## 2024-08-30 NOTE — LETTER
Patient: Fabiola Vale  : 1940    Encounter Date: 2024    Visit  Note   Subjective  Fabiola Vale is a 84 y.o. female who is being seen at [facility] and evaluated for multiple medical problems. Nursing notes, vital signs, and labs were reviewed in the local facility chart.  No chief complaint on file.     This is an 84 year old female evaluated today at [facility] following hospitalization for encephalopathy versus dementia with behavioral disturbance. On examination today, she is pleasant and cooperative. She denies any pain, shortness of breath or problems with bowel or bladder.        Objective  There were no vitals taken for this visit.  Physical Exam  Vitals reviewed.   Constitutional:       Appearance: Normal appearance.   HENT:      Head: Normocephalic.   Cardiovascular:      Rate and Rhythm: Normal rate and regular rhythm.   Pulmonary:      Breath sounds: Normal breath sounds.   Musculoskeletal:      Cervical back: Neck supple.   Skin:     General: Skin is warm and dry.   Neurological:      Mental Status: She is alert.       Assessment/Plan  Assessment & Plan  Acute encephalopathy  Admitted to hospital for acute delirium. Her work up was negative and suspected underlying dementia. She is here for close monitoring and rehabilitation with therapies. Plan is to monitor cognitive status. She has difficulty remembering recent events; when questioned, she does not remember being hospitalized.           Chronic kidney disease, stage 3a (Multi)  Lab Results   Component Value Date    GLUCOSE 71 (L) 2024    CALCIUM 8.3 (L) 2024     2024    K 4.8 2024    CO2 22 2024     (H) 2024    BUN 13 2024    CREATININE 1.04 2024     Stable. Avoid nephrotoxic agents continue to monitor periodically         Chronic obstructive pulmonary disease with acute exacerbation (Multi)  CTA on examination throughout all fields. She is not in respiratory  distress.          HTN (hypertension), benign  Well controlled with CCB and /74         Acquired hypothyroidism  Lab Results   Component Value Date    TSH 2.91 08/20/2024     This is well-controlled she will continue her current supplementation         Generalized weakness  Continue with physical and occupational therapies for rehabilitation in hopes of returning to baseline function.                 Please excuse any errors in grammar or translation related to this dictation. Voice recognition software was utilized to prepare this document.       Electronically Signed By: LEI Bernal   9/1/24  7:37 AM

## 2024-09-01 NOTE — ASSESSMENT & PLAN NOTE
Admitted to hospital for acute delirium. Her work up was negative and suspected underlying dementia. She is here for close monitoring and rehabilitation with therapies. Plan is to monitor cognitive status. She has difficulty remembering recent events; when questioned, she does not remember being hospitalized.

## 2024-09-01 NOTE — ASSESSMENT & PLAN NOTE
Lab Results   Component Value Date    GLUCOSE 71 (L) 08/22/2024    CALCIUM 8.3 (L) 08/22/2024     08/22/2024    K 4.8 08/22/2024    CO2 22 08/22/2024     (H) 08/22/2024    BUN 13 08/22/2024    CREATININE 1.04 08/22/2024     Stable. Avoid nephrotoxic agents continue to monitor periodically

## 2024-09-06 ENCOUNTER — NURSING HOME VISIT (OUTPATIENT)
Dept: POST ACUTE CARE | Facility: EXTERNAL LOCATION | Age: 84
End: 2024-09-06
Payer: MEDICARE

## 2024-09-06 DIAGNOSIS — I10 HTN (HYPERTENSION), BENIGN: ICD-10-CM

## 2024-09-06 DIAGNOSIS — G93.40 ACUTE ENCEPHALOPATHY: Primary | ICD-10-CM

## 2024-09-06 DIAGNOSIS — J45.20 MILD INTERMITTENT ASTHMA WITHOUT COMPLICATION (HHS-HCC): ICD-10-CM

## 2024-09-06 DIAGNOSIS — N18.31 CHRONIC KIDNEY DISEASE, STAGE 3A (MULTI): ICD-10-CM

## 2024-09-06 PROCEDURE — 99308 SBSQ NF CARE LOW MDM 20: CPT

## 2024-09-06 NOTE — LETTER
Patient: Fabiola Vale  : 1940    Encounter Date: 2024    Visit  Note   Subjective  Fabiola Vale is a 84 y.o. female who is being seen at [facility] and evaluated for multiple medical problems. Nursing notes, vital signs, and labs were reviewed in the local facility chart.  No chief complaint on file.     This is an 84-year-old female was evaluated today at [facility] during her killed rehabilitation stay following hospitalization for encephalopathy versus dementia.  On examination today she is resting in bed.  She states she is tired and is taking a nap.  She has no concerns for medical team today.       Objective  There were no vitals taken for this visit.  Physical Exam  Vitals reviewed.   Constitutional:       Appearance: Normal appearance.   HENT:      Head: Normocephalic.   Cardiovascular:      Rate and Rhythm: Normal rate and regular rhythm.   Pulmonary:      Effort: Pulmonary effort is normal.      Breath sounds: Normal breath sounds.   Musculoskeletal:      Cervical back: Neck supple.   Skin:     General: Skin is warm and dry.   Neurological:      Mental Status: She is alert.       Assessment/Plan  Assessment & Plan  Acute encephalopathy  Admitted to hospital for acute delirium. Her work up was negative and suspected underlying dementia. She is here for close monitoring and rehabilitation with therapies. Plan is to continue to monitor cognitive status. She has difficulty remembering recent events; when questioned, she does not remember being hospitalized.           Chronic kidney disease, stage 3a (Multi)  This is stable; we will avoid nephrotoxic agents and monitor periodically.          HTN (hypertension), benign  Well controlled with CCB and /68           Mild intermittent asthma without complication ([facility]-HCC)  Lungs are CTA on examination. She is not in any respiratory distress.                 Please excuse any errors in grammar or translation related to this dictation. Voice  recognition software was utilized to prepare this document.       Electronically Signed By: LEI Bernal   9/8/24  3:30 PM

## 2024-09-08 NOTE — PROGRESS NOTES
[facility]  [facility] [address] [facility], [address] [place], [address] [facility]  Phone: [phone]  FAX: [phone]     Clinical Autonomic Testing Report     Patient ID:  Wilbur Form  [id number]  80 y.o.  1944     REFERRED BY: Louis Rosenthal MD  PCP: Beryle Piedra, MD    Date of Testin2022      Indication/History:     Episodes of loss of consciousness since 2016    Patient is coming for syncope/autonomic dysfunction evaluation. Medications taken 48 hrs before the test: Lipitor     Procedure: This Autonomic Nervous System (ANS) testing is performed by utilizing [address] [place] [address] [employer] Autonomic System, with established protocol. Multiple procedures performed: Heart rate response to deep breathing (HRDB), Valsalva ratio, Heart rate and BP response to head up tilt (HUT), and Quantitative sudomotor axon reflex testing (QSWEAT) . Result:  Heart response to deep  breathing (HRDB): 2 series of 6 cycles were performed and the mean of 6 consecutive cycles was obtained. Average HR difference was 7.12 and E:I ratio was 1.12. Normal response. Heart rate response to Valsalva maneuver:  [id number] BP to Valsalva was measured and BP response in all 4 phases was normal. Heart response was the opposite of BP, a normal response. ( VR = 1.41 )  HUT (head-up tilt) : Uent-rk-hiom BP and HR were measured, up to 15 minutes post tilt. No significant BP reduction or HR acceleration/deceleration. SUDOMOTOR: QSWEAT response showed reduced sweat production in the proximal leg, comparing patient to age group. Impression:   ABNORMAL    Reduced sweat production in the proximal leg, which is non-specific in etiology but can be seen in small fiber polyneuropathy. Cardiovascular autonomic portion is within normal limits with no evidence of orthostatic hypotension or significant tachycardia.        Sahara Delgado MD  Diplomate, American Board of Psychiatry and Neurology  Diplomate, Neuromuscular Visit  Note   Subjective   Fabiola Vale is a 84 y.o. female who is being seen at [facility] and evaluated for multiple medical problems. Nursing notes, vital signs, and labs were reviewed in the local facility chart.  No chief complaint on file.     This is an 84-year-old female was evaluated today at [facility] during her killed rehabilitation stay following hospitalization for encephalopathy versus dementia.  On examination today she is resting in bed.  She states she is tired and is taking a nap.  She has no concerns for medical team today.       Objective   There were no vitals taken for this visit.  Physical Exam  Vitals reviewed.   Constitutional:       Appearance: Normal appearance.   HENT:      Head: Normocephalic.   Cardiovascular:      Rate and Rhythm: Normal rate and regular rhythm.   Pulmonary:      Effort: Pulmonary effort is normal.      Breath sounds: Normal breath sounds.   Musculoskeletal:      Cervical back: Neck supple.   Skin:     General: Skin is warm and dry.   Neurological:      Mental Status: She is alert.       Assessment/Plan   Assessment & Plan  Acute encephalopathy  Admitted to hospital for acute delirium. Her work up was negative and suspected underlying dementia. She is here for close monitoring and rehabilitation with therapies. Plan is to continue to monitor cognitive status. She has difficulty remembering recent events; when questioned, she does not remember being hospitalized.           Chronic kidney disease, stage 3a (Multi)  This is stable; we will avoid nephrotoxic agents and monitor periodically.          HTN (hypertension), benign  Well controlled with CCB and /68           Mild intermittent asthma without complication ([facility]-H[facility])  Lungs are CTA on examination. She is not in any respiratory distress.                 Please excuse any errors in grammar or translation related to this dictation. Voice recognition software was utilized to prepare this document.    Medicine  Diplomate, American Board of Electrodiagnostic Medicine    Note: Raw Data will be scanned separately in Media

## 2024-09-08 NOTE — ASSESSMENT & PLAN NOTE
Admitted to hospital for acute delirium. Her work up was negative and suspected underlying dementia. She is here for close monitoring and rehabilitation with therapies. Plan is to continue to monitor cognitive status. She has difficulty remembering recent events; when questioned, she does not remember being hospitalized.

## 2024-09-20 ENCOUNTER — NURSING HOME VISIT (OUTPATIENT)
Dept: POST ACUTE CARE | Facility: EXTERNAL LOCATION | Age: 84
End: 2024-09-20
Payer: MEDICARE

## 2024-09-20 DIAGNOSIS — R53.1 GENERALIZED WEAKNESS: ICD-10-CM

## 2024-09-20 DIAGNOSIS — N18.31 CHRONIC KIDNEY DISEASE, STAGE 3A (MULTI): ICD-10-CM

## 2024-09-20 DIAGNOSIS — I10 HTN (HYPERTENSION), BENIGN: ICD-10-CM

## 2024-09-20 DIAGNOSIS — G93.40 ACUTE ENCEPHALOPATHY: Primary | ICD-10-CM

## 2024-09-20 DIAGNOSIS — F03.B11 MODERATE DEMENTIA WITH AGITATION, UNSPECIFIED DEMENTIA TYPE (MULTI): ICD-10-CM

## 2024-09-20 PROCEDURE — 99315 NF DSCHRG MGMT 30 MIN/LESS: CPT

## 2024-09-20 NOTE — LETTER
Patient: Fabiola Vale  : 1940    Encounter Date: 2024    Visit  Note   Subjective  Fabiola Vale is a 84 y.o. female who is being seen at [facility] and evaluated for multiple medical problems. Nursing notes, vital signs, and labs were reviewed in the local facility chart.  No chief complaint on file.     Discharge summary:    This is an 84-year-old female who was recently hospitalized for encephalopathy versus dementia with behavioral disturbance.  Her workup in the hospital was negative for anything acute and she was discharged to [facility] for skilled nursing and rehabilitation services.  Throughout her stay while she has been here, she has had no significant increase in cognition and her overall clinical picture appears to be a gradual worsening of her dementia.  She participated in therapies while she was here, her chronic conditions were managed appropriately and there were no complications during her stay.         Objective  There were no vitals taken for this visit.  Physical Exam  Vitals reviewed.   Constitutional:       Appearance: Normal appearance.   HENT:      Head: Normocephalic.   Cardiovascular:      Rate and Rhythm: Normal rate and regular rhythm.   Pulmonary:      Effort: Pulmonary effort is normal. No respiratory distress.      Breath sounds: Normal breath sounds. No wheezing, rhonchi or rales.   Musculoskeletal:      Cervical back: Neck supple.   Skin:     General: Skin is warm and dry.   Neurological:      Mental Status: She is alert. Mental status is at baseline.      Comments: Alert to self   Psychiatric:         Mood and Affect: Mood normal.         Behavior: Behavior normal.       Assessment/Plan  Assessment & Plan  Acute encephalopathy  Admitted to hospital for acute delirium. Her work up was negative and suspected underlying dementia. She was monitored closely for her cognition and there were no significant improvements during her rehabilitation stay. She has  difficulty remembering recent events when questioned and she does not remember being hospitalized. Her most recent mini mental examination score was 11/30. Her plan is to discharge back home with her daughter where she resides with her long term as her caregiver. Her daughter is providing transportation to their home. She does require a walker for ambulation.         HTN (hypertension), benign  Well controlled with CCB throughout her stay here.         Chronic kidney disease, stage 3a (Multi)  This is stable; we will avoid nephrotoxic agents and monitor periodically with PCP upon discharge.         Moderate dementia with agitation, unspecified dementia type (Multi)  Mini mental score 11/30. She will be discharging with her daughter who is her care taker.        Generalized weakness  The patient requires the use of a front wheeled walker as she has a mobility limitation that significantly impairs her ability to participate in one or more MRADLS in the home within a reasonable time frame. She has demonstrated she is safely able to use front wheeled walker for her mobility needs. The functional mobility deficit can be sufficiently resolved with the use of a front wheeled walker.            Time for coordination of care was greater than 35 minutes [facility] chart review, visit and exam, discussion with nursing, therapy and/or social service staff.     Discussed with nursing staff; attempted to contact family there was no answer; left a message to return call. Any further questions regarding her cognition can be addressed by her PCP after discharge.     Please excuse any errors in grammar or translation related to this dictation. Voice recognition software was utilized to prepare this document.       Electronically Signed By: LEI Bernal   9/22/24  2:04 PM

## 2024-09-20 NOTE — PROGRESS NOTES
Visit  Note   Subjective   Fabiola Vale is a 84 y.o. female who is being seen at [facility] and evaluated for multiple medical problems. Nursing notes, vital signs, and labs were reviewed in the local facility chart.  No chief complaint on file.     Discharge summary:    This is an 84-year-old female who was recently hospitalized for encephalopathy versus dementia with behavioral disturbance.  Her workup in the hospital was negative for anything acute and she was discharged to [facility] for skilled nursing and rehabilitation services.  Throughout her stay while she has been here, she has had no significant increase in cognition and her overall clinical picture appears to be a gradual worsening of her dementia.  She participated in therapies while she was here, her chronic conditions were managed appropriately and there were no complications during her stay.         Objective   There were no vitals taken for this visit.  Physical Exam  Vitals reviewed.   Constitutional:       Appearance: Normal appearance.   HENT:      Head: Normocephalic.   Cardiovascular:      Rate and Rhythm: Normal rate and regular rhythm.   Pulmonary:      Effort: Pulmonary effort is normal. No respiratory distress.      Breath sounds: Normal breath sounds. No wheezing, rhonchi or rales.   Musculoskeletal:      Cervical back: Neck supple.   Skin:     General: Skin is warm and dry.   Neurological:      Mental Status: She is alert. Mental status is at baseline.      Comments: Alert to self   Psychiatric:         Mood and Affect: Mood normal.         Behavior: Behavior normal.       Assessment/Plan   Assessment & Plan  Acute encephalopathy  Admitted to hospital for acute delirium. Her work up was negative and suspected underlying dementia. She was monitored closely for her cognition and there were no significant improvements during her rehabilitation stay. She has difficulty remembering recent events when questioned and she does not remember  being hospitalized. Her most recent mini mental examination score was 11/30. Her plan is to discharge back home with her daughter where she resides with her long term as her caregiver. Her daughter is providing transportation to their home. She does require a walker for ambulation.         HTN (hypertension), benign  Well controlled with CCB throughout her stay here.         Chronic kidney disease, stage 3a (Multi)  This is stable; we will avoid nephrotoxic agents and monitor periodically with PCP upon discharge.         Moderate dementia with agitation, unspecified dementia type (Multi)  Mini mental score 11/30. She will be discharging with her daughter who is her care taker.        Generalized weakness  The patient requires the use of a front wheeled walker as she has a mobility limitation that significantly impairs her ability to participate in one or more MRADLS in the home within a reasonable time frame. She has demonstrated she is safely able to use front wheeled walker for her mobility needs. The functional mobility deficit can be sufficiently resolved with the use of a front wheeled walker.            Time for coordination of care was greater than 35 minutes [facility] chart review, visit and exam, discussion with nursing, therapy and/or social service staff.     Discussed with nursing staff; attempted to contact family there was no answer; left a message to return call. Any further questions regarding her cognition can be addressed by her PCP after discharge.     Please excuse any errors in grammar or translation related to this dictation. Voice recognition software was utilized to prepare this document.

## 2024-09-22 NOTE — ASSESSMENT & PLAN NOTE
This is stable; we will avoid nephrotoxic agents and monitor periodically with PCP upon discharge.

## 2024-09-22 NOTE — ASSESSMENT & PLAN NOTE
Admitted to hospital for acute delirium. Her work up was negative and suspected underlying dementia. She was monitored closely for her cognition and there were no significant improvements during her rehabilitation stay. She has difficulty remembering recent events when questioned and she does not remember being hospitalized. Her most recent mini mental examination score was 11/30. Her plan is to discharge back home with her daughter where she resides with her long term as her caregiver. Her daughter is providing transportation to their home. She does require a walker for ambulation.

## 2024-09-23 ENCOUNTER — PATIENT OUTREACH (OUTPATIENT)
Dept: PRIMARY CARE | Facility: CLINIC | Age: 84
End: 2024-09-23
Payer: MEDICARE

## 2024-09-23 ENCOUNTER — DOCUMENTATION (OUTPATIENT)
Dept: PRIMARY CARE | Facility: CLINIC | Age: 84
End: 2024-09-23
Payer: MEDICARE

## 2024-09-23 NOTE — PROGRESS NOTES
Discharge Facility: [facility]  Discharge Diagnosis: UTI  Admission Date: 8/24/2024  Discharge Date: 9/21/2024    PCP Appointment Date:  -9/26/2024 1500    Hospital Encounter and Summary Linked: Yes    See discharge assessment below for further details    Engagement  Call Start Time: 0932 (9/23/2024  9:35 AM)    Medications  Care Management Interventions: No intervention needed (9/23/2024  9:35 AM)  Is the patient taking all medications as directed (includes completed medication regime)?: Yes (9/23/2024  9:35 AM)    Appointments  Does the patient have a primary care provider?: Yes (9/23/2024  9:35 AM)  Care Management Interventions: Verified appointment date/time/provider (9/23/2024  9:35 AM)  Has the patient kept scheduled appointments due by today?: Yes (9/23/2024  9:35 AM)    Self Management  What is the home health agency?: -- (pt daughter Momo states they will continue with their own C) (9/23/2024  9:35 AM)  Has home health visited the patient within 72 hours of discharge?: Call prior to 72 hours (9/23/2024  9:35 AM)    Patient Teaching  Does the patient have access to their discharge instructions?: Yes (9/23/2024  9:35 AM)  What is the patient's perception of their health status since discharge?: Improving (9/23/2024  9:35 AM)  Is the patient/caregiver able to teach back the hierarchy of who to call/visit for symptoms/problems? PCP, Specialist, Home Health nurse, Urgent Care, ED, 911: Yes (9/23/2024  9:35 AM)    Wrap Up  Wrap Up Additional Comments: Pt was admitted to [facility] 8/21-8/24/2024 for encephalopathy and then transferred to [facility] 8/24-9/21/2024. Spoke with daughter Momo who states pt mobility is better and pt seems more alert. Ena reports understanding of discharge instructions. She mentions that facility was giving pt hydrochlorothiazide 25mg daily so she needs to see if she has to pick that prescription up- she reports she would like to discuss with PCP.  Verified pt PCP appt 9/26/2024 1500. Momo states pt has been receiving home health care but she is unsure of agency- she states this will continue in October. Momo confirms pt does have a walker but it is 4 years old and she may need a new one. Momo encouraged to discuss with PCP at appt if prescription is necessary. Momo denies any questions, needs, or concerns at this time. She is encouraged to call if questions or needs arise. (9/23/2024  9:35 AM)  Call End Time: 0940 (9/23/2024  9:35 AM)

## 2024-09-24 ENCOUNTER — TELEPHONE (OUTPATIENT)
Dept: PRIMARY CARE | Facility: CLINIC | Age: 84
End: 2024-09-24
Payer: MEDICARE

## 2024-09-24 NOTE — TELEPHONE ENCOUNTER
Cora Nunn from [facility] called and said she is being discharged from [facility] tomorrow. They are requesting an order for the pt be placed for at home care for skilled nursing, PT & OT.    She would like the order to be faxed to: [phone]  If any questions, please contact Cora at: [phone]

## 2024-09-26 ENCOUNTER — APPOINTMENT (OUTPATIENT)
Dept: PRIMARY CARE | Facility: CLINIC | Age: 84
End: 2024-09-26
Payer: MEDICARE

## 2024-09-26 VITALS
HEART RATE: 56 BPM | OXYGEN SATURATION: 97 % | RESPIRATION RATE: 16 BRPM | TEMPERATURE: 98.1 F | DIASTOLIC BLOOD PRESSURE: 72 MMHG | HEIGHT: 60 IN | WEIGHT: 216 LBS | SYSTOLIC BLOOD PRESSURE: 118 MMHG | BODY MASS INDEX: 42.41 KG/M2

## 2024-09-26 DIAGNOSIS — R41.3 MEMORY LOSS: ICD-10-CM

## 2024-09-26 DIAGNOSIS — N30.00 ACUTE CYSTITIS WITHOUT HEMATURIA: ICD-10-CM

## 2024-09-26 DIAGNOSIS — G93.40 ENCEPHALOPATHY: ICD-10-CM

## 2024-09-26 DIAGNOSIS — H91.8X3 OTHER SPECIFIED HEARING LOSS OF BOTH EARS: Primary | ICD-10-CM

## 2024-09-26 LAB
APPEARANCE UR: ABNORMAL
BILIRUB UR STRIP.AUTO-MCNC: NEGATIVE MG/DL
CAOX CRY #/AREA UR COMP ASSIST: ABNORMAL /HPF
COLOR UR: YELLOW
GLUCOSE UR STRIP.AUTO-MCNC: NORMAL MG/DL
KETONES UR STRIP.AUTO-MCNC: NEGATIVE MG/DL
LEUKOCYTE ESTERASE UR QL STRIP.AUTO: ABNORMAL
NITRITE UR QL STRIP.AUTO: NEGATIVE
PH UR STRIP.AUTO: 5.5 [PH]
PROT UR STRIP.AUTO-MCNC: ABNORMAL MG/DL
RBC # UR STRIP.AUTO: NEGATIVE /UL
RBC #/AREA URNS AUTO: ABNORMAL /HPF
SP GR UR STRIP.AUTO: 1.03
SQUAMOUS #/AREA URNS AUTO: ABNORMAL /HPF
UROBILINOGEN UR STRIP.AUTO-MCNC: ABNORMAL MG/DL
WBC #/AREA URNS AUTO: ABNORMAL /HPF

## 2024-09-26 PROCEDURE — 1160F RVW MEDS BY RX/DR IN RCRD: CPT | Performed by: INTERNAL MEDICINE

## 2024-09-26 PROCEDURE — 1159F MED LIST DOCD IN RCRD: CPT | Performed by: INTERNAL MEDICINE

## 2024-09-26 PROCEDURE — 81001 URINALYSIS AUTO W/SCOPE: CPT

## 2024-09-26 PROCEDURE — 87086 URINE CULTURE/COLONY COUNT: CPT

## 2024-09-26 PROCEDURE — 1123F ACP DISCUSS/DSCN MKR DOCD: CPT | Performed by: INTERNAL MEDICINE

## 2024-09-26 PROCEDURE — 99496 TRANSJ CARE MGMT HIGH F2F 7D: CPT | Performed by: INTERNAL MEDICINE

## 2024-09-26 PROCEDURE — 1157F ADVNC CARE PLAN IN RCRD: CPT | Performed by: INTERNAL MEDICINE

## 2024-09-26 PROCEDURE — 3074F SYST BP LT 130 MM HG: CPT | Performed by: INTERNAL MEDICINE

## 2024-09-26 PROCEDURE — 3078F DIAST BP <80 MM HG: CPT | Performed by: INTERNAL MEDICINE

## 2024-09-26 NOTE — PROGRESS NOTES
Subjective   Fabiola Vale is a 84 y.o. female who presents for Hospital Follow-up.    HPI   Hospitalized  EM 8/21/24 - 8/24/24  Dx: Acute Encephalopathy, resolved, HTN  Labs, EKG, CT head, XR Abdomen  Med changes: Amlodipine 5 mg started.  Dc'd to SNF 8/24/24 - 9/21/24.  Follow up: PCP  Has not been taking Amlodipine d/t no Rx at Pharm.  BP WNL on dc from [facility].  Has not been monitoring.  Denies adverse sx's r/t HTN.  Has not started C PT/OT yet.  Private caregiver 3-4 days/week.    Review of Systems   Constitutional:  Negative for fatigue and fever.   Respiratory:  Negative for cough and shortness of breath.    Cardiovascular:  Negative for chest pain and leg swelling.   All other systems reviewed and are negative.      Health Maintenance Due   Topic Date Due    Pneumococcal Vaccine: 65+ Years (1 of 2 - PCV) Never done    CKD: Urine Protein Screening  Never done    DTaP/Tdap/Td Vaccines (1 - Tdap) Never done    Zoster Vaccines (1 of 2) Never done    RSV Pregnant patients and/or  patients aged 60+ years (1 - 1-dose 60+ series) Never done    Echocardiogram  11/23/2021    Bone Density Scan  11/24/2022    Influenza Vaccine (1) Never done    COVID-19 Vaccine (4 - 2023-24 season) 09/01/2024       Objective   /72   Pulse 56   Temp 36.7 °C (98.1 °F)   Resp 16   Ht 1.524 m (5')   Wt 98 kg (216 lb)   SpO2 97%   BMI 42.18 kg/m²     Physical Exam  Vitals and nursing note reviewed.   Constitutional:       Appearance: Normal appearance.   HENT:      Head: Normocephalic.   Eyes:      Conjunctiva/sclera: Conjunctivae normal.      Pupils: Pupils are equal, round, and reactive to light.   Cardiovascular:      Rate and Rhythm: Normal rate and regular rhythm.      Pulses: Normal pulses.      Heart sounds: Normal heart sounds.   Pulmonary:      Effort: Pulmonary effort is normal.      Breath sounds: Normal breath sounds.   Musculoskeletal:         General: No swelling.      Cervical back: Neck supple.   Skin:      General: Skin is warm and dry.   Neurological:      General: No focal deficit present.      Mental Status: She is oriented to person, place, and time.         Assessment/Plan   Problem List Items Addressed This Visit    None  Visit Diagnoses       Other specified hearing loss of both ears    -  Primary    Relevant Orders    Referral to Audiology    Memory loss        Relevant Orders    Referral to Neurology    Encephalopathy        Relevant Orders    Referral to Neurology    Acute cystitis without hematuria        Relevant Orders    Urinalysis with Reflex Microscopic (Completed)    Urine Culture    Microscopic Only, Urine (Completed)          Reviewed records  Refer to neuro    Recheck urine  Answered questions  Cont meds  Check hearing   Stable based on symptoms and exam.  Continue established treatment plan and follow up at least yearly.

## 2024-09-27 ENCOUNTER — PATIENT OUTREACH (OUTPATIENT)
Dept: PRIMARY CARE | Facility: CLINIC | Age: 84
End: 2024-09-27
Payer: MEDICARE

## 2024-09-27 ASSESSMENT — ENCOUNTER SYMPTOMS
COUGH: 0
SHORTNESS OF BREATH: 0
FEVER: 0
FATIGUE: 0

## 2024-09-27 NOTE — PROGRESS NOTES
Unable to reach patient for call back after recent hospitalization and PCP follow up appt.   Left voicemail with call back number for patient to call if needed   If no voicemail available call attempts x 2 were made to contact the patient to assist with any questions or concerns patient may have.

## 2024-09-28 LAB — BACTERIA UR CULT: NORMAL

## 2024-10-01 ENCOUNTER — TELEPHONE (OUTPATIENT)
Dept: PRIMARY CARE | Facility: CLINIC | Age: 84
End: 2024-10-01
Payer: MEDICARE

## 2024-10-01 DIAGNOSIS — I50.42 CHRONIC COMBINED SYSTOLIC AND DIASTOLIC CONGESTIVE HEART FAILURE: Primary | ICD-10-CM

## 2024-10-01 NOTE — TELEPHONE ENCOUNTER
Cora Nunn, [facility], [facility] requesting PT/OT order and visit note to be faxed.    Phone: [phone]  Fax: [phone]

## 2024-10-02 ENCOUNTER — TELEPHONE (OUTPATIENT)
Dept: PRIMARY CARE | Facility: CLINIC | Age: 84
End: 2024-10-02
Payer: MEDICARE

## 2024-10-02 NOTE — TELEPHONE ENCOUNTER
Brittany made aware.  Also, clarified whether [facility] order faxed on 9/25/24 was received.  Per Brittany, order was received.

## 2024-10-02 NOTE — TELEPHONE ENCOUNTER
Brittany, [facility], left  stating she received PT/OT order.  Would not be able to start services until Monday.  Would need delay start of care order.  Or call back w/SN order can open tomorrow or Friday.    [phone]    [facility] order for SN, PT, OT faxed on 9/25/24.

## 2024-10-04 ENCOUNTER — CLINICAL SUPPORT (OUTPATIENT)
Dept: AUDIOLOGY | Facility: CLINIC | Age: 84
End: 2024-10-04
Payer: MEDICARE

## 2024-10-04 DIAGNOSIS — H91.8X3 OTHER SPECIFIED HEARING LOSS OF BOTH EARS: ICD-10-CM

## 2024-10-04 DIAGNOSIS — H90.3 SENSORINEURAL HEARING LOSS (SNHL) OF BOTH EARS: Primary | ICD-10-CM

## 2024-10-04 PROCEDURE — 92550 TYMPANOMETRY & REFLEX THRESH: CPT

## 2024-10-04 PROCEDURE — 92557 COMPREHENSIVE HEARING TEST: CPT

## 2024-10-04 NOTE — PROGRESS NOTES
ADULT AUDIOLOGY EVALUATION    Name:  Fabiola Vale  :  1940  Age:  84 y.o.  Date of Evaluation:  10/4/2024     IMPRESSIONS     Today's test results indicate excessive middle ear compliance bilaterally, with mild sloping to profound sensorineural hearing loss in the right ear, and mild sloping to severe sensorineural hearing loss in the left ear. Word recognition is excellent in both ears.     RECOMMENDATIONS     Binaural hearing aids were recommended today. Patient was provided with a copy of her audiogram as well as written instructions to obtain hearing aids, including contacting insurance to determine hearing aid benefit and in-network providers. It was noted that if she does not have an insurance benefit, hearing aids would be an out of pocket expense. A hearing aid consultation can be scheduled through [facility] at [phone].   Continue medical follow up with PCP.  Return for annual hearing evaluation or sooner should new concerns arise.    Time: 6183-4836    HISTORY     Fabiola Vale is seen today for an audiologic evaluation. Patient arrived with her granddaughter and a caretaker for today's appointment. Fabiola reports noticing hearing difficulties for a long time. She has not had a hearing test in the past. She denied pain or pressure in her ears. Patient noted occasional lightheadedness, but could not provide further details on this. She reported noise exposure in the past, but could not specify environments. She is interested in hearing aids if they are recommended today. Patient denied tinnitus, aural fullness, recent ear infections, otalgia, otorrhea, and history of otologic surgeries.      TEST RESULTS     Otoscopic Evaluation:  Right Ear: Clear ear canal with unremarkable tympanic membrane  Left Ear: Clear ear canal with unremarkable tympanic membrane    Tympanometry:   Right Ear: Deep, Ad tympanogram with normal ear canal volume, peak pressure and excessive  compliance.  Left Ear: Deep, Ad tympanogram with normal ear canal volume, peak pressure and excessive compliance.    *Of important note, tympanometry consistently showed large ear canal volumes in both ears during today's testing. This was later determined to be equipment error.    Ipsilateral Acoustic Reflexes:   Right Ear: Present 500-1000 Hz, absent 7753-6882 Hz.   Left Ear: Present 500-1000 Hz, absent 5662-1984 Hz.     Pure Tone Audiometry (125-8000 Hz):     Right Ear: Mild to moderate sensorineural hearing loss from 125-500 Hz, rising back to mild from 750-1500 Hz, sloping to moderate at 2000 Hz, moderately-severe from 1419-1957 Hz, severe at 6000 Hz, and profound with no measurable response at 8000 Hz.     Left Ear: Mild to moderate sensorineural hearing loss from 125-500 Hz, rising back to mild from 750-1500 Hz, sloping to moderate at 2000 Hz, moderately-severe from 3475-9332 Hz, and severe from 9714-2067 Hz.    Reliability: Fair- re-instruction was required multiple times during testing.     Speech Audiometry:   Right Ear:    Speech Reception Threshold (SRT) was obtained at 30 dBHL using monitored live voice (MLV).   Word Recognition scores were excellent (100%) in quiet when words were presented at 85 dBHL using recorded NU-6 word list ordered by difficulty.  Left Ear:    Speech Reception Threshold (SRT) was obtained at 30 dBHL using monitored live voice (MLV).   Word Recognition scores were excellent (100%) in quiet when words were presented at 85 dBHL using recorded NU-6 word list ordered by difficulty.     Distortion Product Otoacoustic Emissions (DPOAEs):  Right Ear: Absent 9176-1714 Hz.   Left Ear: Present at 2514-7033, absent at 2976-8520 Hz.          Ingrid Stroud, Ana, CCC-A  Clinical Audiologist    Degree of Hearing Sensitivity Decibel Range   Within Normal Limits (WNL) 0-25   Mild 26-40   Moderate 41-55   Moderately-Severe 56-70   Severe 71-90   Profound 91+      Key   CNT/DNT Could Not Test/Did  Not Test   TM Tympanic Membrane   WNL Within Normal Limits   HA Hearing Aid   SNHL Sensorineural Hearing Loss   CHL Conductive Hearing Loss   NIHL Noise-Induced Hearing Loss   ECV Ear Canal Volume   MLV Monitored Live Voice     AUDIOGRAM

## 2024-10-11 ENCOUNTER — APPOINTMENT (OUTPATIENT)
Dept: AUDIOLOGY | Facility: CLINIC | Age: 84
End: 2024-10-11
Payer: MEDICARE

## 2024-10-24 ENCOUNTER — TELEPHONE (OUTPATIENT)
Dept: PRIMARY CARE | Facility: CLINIC | Age: 84
End: 2024-10-24
Payer: MEDICARE

## 2024-10-24 NOTE — TELEPHONE ENCOUNTER
Mahin, [phone], left vm stating she had visit today w/pt today.  BP elevated.  Asymptomatic.  SBP: 230, 220.  Best she could get was 204/77  HR 48.

## 2024-10-28 ENCOUNTER — PATIENT OUTREACH (OUTPATIENT)
Dept: PRIMARY CARE | Facility: CLINIC | Age: 84
End: 2024-10-28
Payer: MEDICARE

## 2024-12-04 ENCOUNTER — PATIENT OUTREACH (OUTPATIENT)
Dept: PRIMARY CARE | Facility: CLINIC | Age: 84
End: 2024-12-04
Payer: MEDICARE

## 2025-02-06 DIAGNOSIS — E66.813 CLASS 3 SEVERE OBESITY DUE TO EXCESS CALORIES WITH SERIOUS COMORBIDITY AND BODY MASS INDEX (BMI) OF 45.0 TO 49.9 IN ADULT: ICD-10-CM

## 2025-02-06 DIAGNOSIS — J44.1 CHRONIC OBSTRUCTIVE PULMONARY DISEASE WITH ACUTE EXACERBATION (MULTI): ICD-10-CM

## 2025-02-06 DIAGNOSIS — M15.0 PRIMARY OSTEOARTHRITIS INVOLVING MULTIPLE JOINTS: ICD-10-CM

## 2025-02-06 DIAGNOSIS — R53.1 WEAKNESS: ICD-10-CM

## 2025-02-06 DIAGNOSIS — E66.01 CLASS 3 SEVERE OBESITY DUE TO EXCESS CALORIES WITH SERIOUS COMORBIDITY AND BODY MASS INDEX (BMI) OF 45.0 TO 49.9 IN ADULT: ICD-10-CM

## 2025-02-21 ENCOUNTER — OFFICE VISIT (OUTPATIENT)
Dept: NEUROLOGY | Facility: CLINIC | Age: 85
End: 2025-02-21
Payer: MEDICARE

## 2025-02-21 ENCOUNTER — APPOINTMENT (OUTPATIENT)
Dept: GERIATRIC MEDICINE | Facility: CLINIC | Age: 85
End: 2025-02-21
Payer: MEDICARE

## 2025-02-21 VITALS — HEART RATE: 54 BPM | DIASTOLIC BLOOD PRESSURE: 68 MMHG | SYSTOLIC BLOOD PRESSURE: 163 MMHG

## 2025-02-21 DIAGNOSIS — F01.50 VASCULAR DEMENTIA WITHOUT BEHAVIORAL DISTURBANCE, PSYCHOTIC DISTURBANCE, MOOD DISTURBANCE, OR ANXIETY, UNSPECIFIED DEMENTIA SEVERITY (MULTI): Primary | ICD-10-CM

## 2025-02-21 PROCEDURE — 1157F ADVNC CARE PLAN IN RCRD: CPT | Performed by: PSYCHIATRY & NEUROLOGY

## 2025-02-21 PROCEDURE — 99215 OFFICE O/P EST HI 40 MIN: CPT | Performed by: PSYCHIATRY & NEUROLOGY

## 2025-02-21 PROCEDURE — 99205 OFFICE O/P NEW HI 60 MIN: CPT | Performed by: PSYCHIATRY & NEUROLOGY

## 2025-02-21 PROCEDURE — 1159F MED LIST DOCD IN RCRD: CPT | Performed by: PSYCHIATRY & NEUROLOGY

## 2025-02-21 PROCEDURE — 3078F DIAST BP <80 MM HG: CPT | Performed by: PSYCHIATRY & NEUROLOGY

## 2025-02-21 PROCEDURE — 1123F ACP DISCUSS/DSCN MKR DOCD: CPT | Performed by: PSYCHIATRY & NEUROLOGY

## 2025-02-21 PROCEDURE — 3077F SYST BP >= 140 MM HG: CPT | Performed by: PSYCHIATRY & NEUROLOGY

## 2025-02-21 NOTE — PROGRESS NOTES
[place], [place]      Department of Neurology  [facility]     Initial Consultation    PCP:  Dr. Evelyn Hopper         2025  Re:  Fabiola Vale     : 1940   MRN [id number]    CC: 85yo woman referred for the evaluation of mild cognitive impairment, with shruti Barr.  Info from pt and the EMR.    A&P: History: Pt with a decade of gradual decline, increasingly limited over the past five years.  This year shruti moved pt to \[facility]"" where the pt has dome well: more active, more interactive, and fitting-in to social environment.  Genl exam with subtle RUSB systolic M.  Neuro exam with fix but not follow, responded well to OKN strip, also bilat distal LE areflexia.  Cognitive exam limited due to her inability to sustain focus or engage in many single step tests.  However, when full engaged shw does well in a great number of simple tasks that evoke bimanual responses, simple 1-2 word responses and some 15-20s sustained engagement that is not otherwise evident.  MMSE=10/30 and AD8=6/8 both consistent with findings in clinical exam.  Head CT (2024) with chronic diffuse atrophy, kate Lt > Rt parasylvian and WM changes c/w chronic small vessel disease.    Interpret: Pt with slowly progressive course of cognitive decline most evident over the past five years , unpunctuated and c/w small vessel ischemic disease.  There is not behavioral dyscontrol, she is well engaged at her new \[facility]\"" and family is very supportive, kate daughter here today.     Plan: Pt is doing well on a stable medicinal regimen now moved to Senior Living where she is adapting well with the terrific engagement of her daughter.   I do not see an indication for any medicinal changes.  Her new environment promotes for social interactions and physical activities that are well-suited to her sustained her condition.    F/U:  I discussed these matters with the pt and more particularly with her daughter.  Her  daughter and I discussed a non-intrusive but fully supportive approach.  I will arrange RTC in 3 months so she remains connected with our clinic after my impending half-way.     Thank you for allowing me to participate in your care.   Sincerely yours, Jonathon Samuels M.D., Ph.D.    History of Present Illness:   :  In the past five years family has noticed continued, progressive decline.  Knows microwave is war food, but forgets you can't put utsneils in there.  :  She wanders at home, then finds food in refrigerator and eats by handfuls.  Still dresses and washes herself most of the time. : Moved to senior dementia care.  She is interacting with people; at home she was just eating and sleeping.  She is getting more interactive, doing bingo.  No behavior problems.    Med Hx   Allergies: NKAD  Rx:  [id number], [id number], [id number], chlorhexadine    H/O: COPD, respir fail, CAD, HTN, HLD, CKD3a, HGly, hypoT, asthma, obesity, GERD, OA,  Ddefic, acute encephalopathy, anxiety,  depress,  AlzDis w/ behav disturb, sleep apnea  S/P: wn  Implants: none  Trauma: none    FHx  Par: Dad d lung CA; Mom dementia in old age      Sibs:  sister and brother in [facility]  Kids:  Momo's sister     PSHx  Marital:   Home: [facility]     Educ:  10th grd  Employ: [employer]    Driving: stopped  Sleep: no issues     Exercise: wheelchair Firearms: none  EtOH:  stopped   Smoking: stopped       Subs Abuse: none         ROS  Genl: w/o Skin-Skel nl Cardio-pulm nl   U/L-GI nl    Neuro:  w/o LBP nl   w/o neck pain wnl  w/o HAs wnl   w/o CVA, TIA, TMB,  VBI     Physical Exam  Vitals: BA=193/68,  HR=54,  RR=16  , wt not weighted in wheelchair  General:  Neck FROM w/o pain  Lungs w/ clear,     RRR w/ subtle sys M at RUSB,    full carotids w/o T/B      Abdo soft +BS no bruit      Extrems w/ trace bilat ankle edema    Neurological Exam  Mental State: Affect:  not anxious or sad    but veronica tells me that she is quieter than  usual preserved range  no hallucins, delusions, or agitation  Cranial Nerves: Vision: PERRL D&C,  VFF OU to finger approach     Versions: FEOM horiz & vert  w/o nystagmus or diplopia lids w/ 1mm Rt ptosis   Motility:  pursuit to shiny stim, hOKNs intact to red stripes   Face: Sens symm to LT & cold,     Expression: symm tone & mvmt   Hearing to voice wnl,   Tongue: mobile w/o fascics    Shoulders: symm mobile  Motor: strong but must be provoked     did not engage to test drift          briefly steady     tone: mild symm paratonia, less comfortable here        w/o invol mvmts   Reflexes: MSRs +1UEs, could not evoke LEs   Release:  no Elder's/grasp/palmoment,   Sensation:  symm LT, cold, and vib not responsive, wrm of cold   Romberg not test b/o instability on wide base  Coordination: FT fast w/ reg pace bilat     FNF accurate & symm to fixed target     Gait: nl pace, stride, armswing  wnl turning in 3.0 steps,   stable tandem forward     Cognitive Exam   Handedness:  fully RH     Language:  primary: American English    recept:  does not answer questions       express: phrasesx1-2 words, mostly stares w/o a response   repeat:  does not answer most direct question    name:  body parts=3/3   objects=2/3 (not naming color of pen)      read:  single words, then stops     Praxis:  does not raise hand by name, but does so symmettically to my out-stretched hands    Attention: visual  & tactile  DSSE testing not able to sustain engagement    Memory: visspat: room topology (door, pc, prev room) =0/3 not noticing or responding     Executive:  no response to repeated simple, direct questions       Testing MMSE=10/30,     AD8=6/8  both suggestive of significant impairment    Labs (to 9-):  wbc=4.9,    hct=42.3,    wfy=553   glu=71, bun/crt=15/1.22,  GFR=44,     AST=19  ALT=7  AlkP=80   Ca=8.5  chol=, hdl=, ldl=, chol/hdl=, vldl=, TG=  lipase=6,  HStrop=12  TSH=2.91     fT4=   B1=     B12=,  Fol=,  D3=  NH3=39,  procalcit=0.04  UA (9-): CaOx=4, wbc=1-5, RBC=1-2, Squam+1-9; culture no signif growth   Opiate presumpt+    Urobil=2(1+)  WBC=1-5, RBC=3-5, squam=1-9   Clear yellow, trace ketones, +2 urobilinogen  EKG (8-): Sinus tayler=59, HAeQy=690,  Abnl: Twav abnl ant leads, short QT  Head CT (8-):  1. No evidence of acute intracranial abnormality or significant  interval change.  2. Stable cerebral volume loss and chronic microangiopathic changes  of the periventricular and deep white matter.

## 2025-04-01 ENCOUNTER — OFFICE VISIT (OUTPATIENT)
Dept: PRIMARY CARE | Facility: CLINIC | Age: 85
End: 2025-04-01
Payer: MEDICARE

## 2025-04-01 VITALS
SYSTOLIC BLOOD PRESSURE: 118 MMHG | OXYGEN SATURATION: 98 % | DIASTOLIC BLOOD PRESSURE: 76 MMHG | TEMPERATURE: 97.7 F | HEART RATE: 60 BPM | RESPIRATION RATE: 18 BRPM | HEIGHT: 60 IN | BODY MASS INDEX: 39.27 KG/M2 | WEIGHT: 200 LBS

## 2025-04-01 DIAGNOSIS — F03.B11 MODERATE DEMENTIA WITH AGITATION, UNSPECIFIED DEMENTIA TYPE (MULTI): Primary | ICD-10-CM

## 2025-04-01 DIAGNOSIS — M54.41 ACUTE RIGHT-SIDED LOW BACK PAIN WITH RIGHT-SIDED SCIATICA: ICD-10-CM

## 2025-04-01 DIAGNOSIS — N18.31 CHRONIC KIDNEY DISEASE, STAGE 3A (MULTI): ICD-10-CM

## 2025-04-01 DIAGNOSIS — E66.813 CLASS 3 SEVERE OBESITY DUE TO EXCESS CALORIES WITH SERIOUS COMORBIDITY AND BODY MASS INDEX (BMI) OF 45.0 TO 49.9 IN ADULT: ICD-10-CM

## 2025-04-01 DIAGNOSIS — M25.551 PAIN OF RIGHT HIP: ICD-10-CM

## 2025-04-01 DIAGNOSIS — J96.02 ACUTE HYPERCAPNIC RESPIRATORY FAILURE: ICD-10-CM

## 2025-04-01 DIAGNOSIS — E03.9 ACQUIRED HYPOTHYROIDISM: ICD-10-CM

## 2025-04-01 DIAGNOSIS — E66.01 CLASS 3 SEVERE OBESITY DUE TO EXCESS CALORIES WITH SERIOUS COMORBIDITY AND BODY MASS INDEX (BMI) OF 45.0 TO 49.9 IN ADULT: ICD-10-CM

## 2025-04-01 DIAGNOSIS — I50.82 BIVENTRICULAR CONGESTIVE HEART FAILURE: ICD-10-CM

## 2025-04-01 DIAGNOSIS — J44.1 CHRONIC OBSTRUCTIVE PULMONARY DISEASE WITH ACUTE EXACERBATION (MULTI): ICD-10-CM

## 2025-04-01 PROCEDURE — 1160F RVW MEDS BY RX/DR IN RCRD: CPT | Performed by: INTERNAL MEDICINE

## 2025-04-01 PROCEDURE — 99214 OFFICE O/P EST MOD 30 MIN: CPT | Performed by: INTERNAL MEDICINE

## 2025-04-01 PROCEDURE — 1157F ADVNC CARE PLAN IN RCRD: CPT | Performed by: INTERNAL MEDICINE

## 2025-04-01 PROCEDURE — 3074F SYST BP LT 130 MM HG: CPT | Performed by: INTERNAL MEDICINE

## 2025-04-01 PROCEDURE — 1123F ACP DISCUSS/DSCN MKR DOCD: CPT | Performed by: INTERNAL MEDICINE

## 2025-04-01 PROCEDURE — 1158F ADVNC CARE PLAN TLK DOCD: CPT | Performed by: INTERNAL MEDICINE

## 2025-04-01 PROCEDURE — G2211 COMPLEX E/M VISIT ADD ON: HCPCS | Performed by: INTERNAL MEDICINE

## 2025-04-01 PROCEDURE — 1159F MED LIST DOCD IN RCRD: CPT | Performed by: INTERNAL MEDICINE

## 2025-04-01 PROCEDURE — 3078F DIAST BP <80 MM HG: CPT | Performed by: INTERNAL MEDICINE

## 2025-04-01 RX ORDER — PREDNISONE 10 MG/1
TABLET ORAL
Qty: 30 TABLET | Refills: 0 | Status: SHIPPED | OUTPATIENT
Start: 2025-04-01 | End: 2025-04-11

## 2025-04-01 RX ORDER — LEVOTHYROXINE SODIUM 25 UG/1
25 TABLET ORAL DAILY
Qty: 90 TABLET | Refills: 3 | Status: SHIPPED | OUTPATIENT
Start: 2025-04-01

## 2025-04-01 NOTE — PROGRESS NOTES
Subjective   Fabiola Vale is a 84 y.o. female who presents for Foot Pain.    HPI   Pt reports R foot pain x3 weeks.  Pt states she thinks she may have fallen.  H/o Dementia. Poor historian.  Daughter states pt c/o pain to R side/hip, travels to RLE.  XR done at [facility].  Arthritis.  Difficulty w/ambulation, lifting foot/RLE.    Review of Systems   Constitutional:  Negative for fatigue and fever.   Respiratory:  Negative for cough and shortness of breath.    Cardiovascular:  Negative for chest pain and leg swelling.   Musculoskeletal:  Positive for arthralgias and back pain.   All other systems reviewed and are negative.      Health Maintenance Due   Topic Date Due    Pneumococcal Vaccine (1 of 2 - PCV) Never done    CKD: Urine Protein Screening  Never done    DTaP/Tdap/Td Vaccines (1 - Tdap) Never done    Zoster Vaccines (1 of 2) Never done    RSV High Risk: (Elderly (60+) or Pregnant Population) (1 - 1-dose 75+ series) Never done    Echocardiogram  11/23/2021    Bone Density Scan  11/24/2022    COVID-19 Vaccine (4 - 2024-25 season) 09/01/2024       Objective   /76   Pulse 60   Temp 36.5 °C (97.7 °F)   Resp 18   Ht 1.524 m (5')   Wt 90.7 kg (200 lb)   SpO2 98%   BMI 39.06 kg/m²     Physical Exam  Vitals and nursing note reviewed.   Constitutional:       Appearance: Normal appearance.   HENT:      Head: Normocephalic.   Eyes:      Conjunctiva/sclera: Conjunctivae normal.      Pupils: Pupils are equal, round, and reactive to light.   Cardiovascular:      Rate and Rhythm: Normal rate and regular rhythm.      Pulses: Normal pulses.      Heart sounds: Normal heart sounds.   Pulmonary:      Effort: Pulmonary effort is normal.      Breath sounds: Normal breath sounds.   Musculoskeletal:         General: No swelling.      Cervical back: Neck supple.   Skin:     General: Skin is warm and dry.   Neurological:      General: No focal deficit present.      Mental Status: She is oriented to person, place, and time.      Tender ir/er hip  Neg str leg raise test  Moves extremities     Assessment/Plan   Problem List Items Addressed This Visit       Acute hypercapnic respiratory failure    Chronic obstructive pulmonary disease with acute exacerbation (Multi)    Chronic kidney disease, stage 3a (Multi)    Congestive heart failure    Hypothyroidism    Relevant Medications    levothyroxine (Synthroid, Levoxyl) 25 mcg tablet    Class 3 severe obesity due to excess calories with serious comorbidity and body mass index (BMI) of 45.0 to 49.9 in adult    Moderate dementia with agitation, unspecified dementia type (Multi) - Primary     Other Visit Diagnoses       Pain of right hip        Acute right-sided low back pain with right-sided sciatica        Relevant Medications    predniSONE (Deltasone) 10 mg tablet    Other Relevant Orders    XR lumbar spine 2-3 views        Cont meds  Reviewed xrays  Cont exercises  Trial of steroid  Check low back xray  Stable based on symptoms and exam.  Continue established treatment plan and follow up at least yearly.

## 2025-04-02 ASSESSMENT — ENCOUNTER SYMPTOMS
FEVER: 0
SHORTNESS OF BREATH: 0
ARTHRALGIAS: 1
FATIGUE: 0
COUGH: 0
BACK PAIN: 1

## 2025-04-03 ENCOUNTER — HOSPITAL ENCOUNTER (OUTPATIENT)
Dept: RADIOLOGY | Facility: CLINIC | Age: 85
Discharge: HOME | End: 2025-04-03
Payer: MEDICARE

## 2025-04-03 DIAGNOSIS — M54.41 ACUTE RIGHT-SIDED LOW BACK PAIN WITH RIGHT-SIDED SCIATICA: ICD-10-CM

## 2025-04-03 PROCEDURE — 72100 X-RAY EXAM L-S SPINE 2/3 VWS: CPT | Performed by: RADIOLOGY

## 2025-04-03 PROCEDURE — 72100 X-RAY EXAM L-S SPINE 2/3 VWS: CPT
